# Patient Record
Sex: FEMALE | Race: WHITE | NOT HISPANIC OR LATINO
[De-identification: names, ages, dates, MRNs, and addresses within clinical notes are randomized per-mention and may not be internally consistent; named-entity substitution may affect disease eponyms.]

---

## 2016-05-06 RX ORDER — ISOSORBIDE MONONITRATE 60 MG/1
0.5 TABLET, EXTENDED RELEASE ORAL
Qty: 30 | Refills: 0 | DISCHARGE
Start: 2016-05-06 | End: 2016-06-05

## 2017-01-20 ENCOUNTER — APPOINTMENT (OUTPATIENT)
Dept: DERMATOLOGY | Facility: CLINIC | Age: 82
End: 2017-01-20

## 2017-02-04 ENCOUNTER — APPOINTMENT (OUTPATIENT)
Dept: RADIOLOGY | Facility: CLINIC | Age: 82
End: 2017-02-04

## 2017-02-04 ENCOUNTER — OUTPATIENT (OUTPATIENT)
Dept: OUTPATIENT SERVICES | Facility: HOSPITAL | Age: 82
LOS: 1 days | End: 2017-02-04
Payer: MEDICARE

## 2017-02-04 DIAGNOSIS — Z98.42 CATARACT EXTRACTION STATUS, LEFT EYE: Chronic | ICD-10-CM

## 2017-02-04 DIAGNOSIS — Z98.41 CATARACT EXTRACTION STATUS, RIGHT EYE: Chronic | ICD-10-CM

## 2017-02-04 DIAGNOSIS — Z00.8 ENCOUNTER FOR OTHER GENERAL EXAMINATION: ICD-10-CM

## 2017-02-04 DIAGNOSIS — Z90.81 ACQUIRED ABSENCE OF SPLEEN: Chronic | ICD-10-CM

## 2017-02-04 PROCEDURE — 71046 X-RAY EXAM CHEST 2 VIEWS: CPT

## 2017-04-14 ENCOUNTER — APPOINTMENT (OUTPATIENT)
Dept: DERMATOLOGY | Facility: CLINIC | Age: 82
End: 2017-04-14

## 2017-05-05 ENCOUNTER — APPOINTMENT (OUTPATIENT)
Dept: DERMATOLOGY | Facility: CLINIC | Age: 82
End: 2017-05-05

## 2017-08-01 ENCOUNTER — APPOINTMENT (OUTPATIENT)
Dept: DERMATOLOGY | Facility: CLINIC | Age: 82
End: 2017-08-01

## 2017-08-09 ENCOUNTER — APPOINTMENT (OUTPATIENT)
Dept: DERMATOLOGY | Facility: CLINIC | Age: 82
End: 2017-08-09
Payer: MEDICARE

## 2017-08-09 PROCEDURE — 99212 OFFICE O/P EST SF 10 MIN: CPT

## 2017-09-08 ENCOUNTER — APPOINTMENT (OUTPATIENT)
Dept: DERMATOLOGY | Facility: CLINIC | Age: 82
End: 2017-09-08

## 2017-10-11 ENCOUNTER — APPOINTMENT (OUTPATIENT)
Dept: GASTROENTEROLOGY | Facility: CLINIC | Age: 82
End: 2017-10-11
Payer: MEDICARE

## 2017-10-11 VITALS
DIASTOLIC BLOOD PRESSURE: 88 MMHG | BODY MASS INDEX: 20.66 KG/M2 | HEART RATE: 78 BPM | SYSTOLIC BLOOD PRESSURE: 170 MMHG | HEIGHT: 64 IN | WEIGHT: 121 LBS

## 2017-10-11 DIAGNOSIS — K58.9 IRRITABLE BOWEL SYNDROME W/OUT DIARRHEA: ICD-10-CM

## 2017-10-11 DIAGNOSIS — K21.9 GASTRO-ESOPHAGEAL REFLUX DISEASE W/OUT ESOPHAGITIS: ICD-10-CM

## 2017-10-11 DIAGNOSIS — Z85.038 PERSONAL HISTORY OF OTHER MALIGNANT NEOPLASM OF LARGE INTESTINE: ICD-10-CM

## 2017-10-11 PROCEDURE — 99213 OFFICE O/P EST LOW 20 MIN: CPT

## 2017-10-11 RX ORDER — NITROGLYCERIN 0.3 MG/1
0.3 TABLET SUBLINGUAL
Refills: 0 | Status: ACTIVE | COMMUNITY

## 2017-10-11 RX ORDER — SACCHAROMYCES BOULARDII 50 MG
250 CAPSULE ORAL
Refills: 0 | Status: ACTIVE | COMMUNITY

## 2017-10-11 RX ORDER — UBIDECARENONE 200 MG
500 CAPSULE ORAL
Refills: 0 | Status: ACTIVE | COMMUNITY

## 2017-10-11 RX ORDER — NICOTINE 11MG/24HR
50 MCG PATCH, TRANSDERMAL 24 HOURS TRANSDERMAL
Refills: 0 | Status: ACTIVE | COMMUNITY

## 2017-11-06 ENCOUNTER — APPOINTMENT (OUTPATIENT)
Dept: DERMATOLOGY | Facility: CLINIC | Age: 82
End: 2017-11-06
Payer: MEDICARE

## 2017-11-06 PROCEDURE — 99213 OFFICE O/P EST LOW 20 MIN: CPT

## 2017-12-20 ENCOUNTER — APPOINTMENT (OUTPATIENT)
Dept: DERMATOLOGY | Facility: CLINIC | Age: 82
End: 2017-12-20

## 2018-04-02 ENCOUNTER — APPOINTMENT (OUTPATIENT)
Dept: DERMATOLOGY | Facility: CLINIC | Age: 83
End: 2018-04-02
Payer: MEDICARE

## 2018-04-02 PROCEDURE — 99213 OFFICE O/P EST LOW 20 MIN: CPT

## 2018-08-22 ENCOUNTER — APPOINTMENT (OUTPATIENT)
Dept: DERMATOLOGY | Facility: CLINIC | Age: 83
End: 2018-08-22
Payer: MEDICARE

## 2018-08-22 PROCEDURE — 99212 OFFICE O/P EST SF 10 MIN: CPT

## 2018-09-12 ENCOUNTER — APPOINTMENT (OUTPATIENT)
Dept: DERMATOLOGY | Facility: CLINIC | Age: 83
End: 2018-09-12

## 2018-11-14 ENCOUNTER — APPOINTMENT (OUTPATIENT)
Dept: DERMATOLOGY | Facility: CLINIC | Age: 83
End: 2018-11-14
Payer: MEDICARE

## 2018-11-14 PROCEDURE — 99213 OFFICE O/P EST LOW 20 MIN: CPT

## 2018-12-03 ENCOUNTER — APPOINTMENT (OUTPATIENT)
Dept: DERMATOLOGY | Facility: CLINIC | Age: 83
End: 2018-12-03

## 2019-04-15 ENCOUNTER — APPOINTMENT (OUTPATIENT)
Dept: DERMATOLOGY | Facility: CLINIC | Age: 84
End: 2019-04-15
Payer: MEDICARE

## 2019-04-15 PROCEDURE — 99213 OFFICE O/P EST LOW 20 MIN: CPT

## 2019-10-21 ENCOUNTER — APPOINTMENT (OUTPATIENT)
Dept: DERMATOLOGY | Facility: CLINIC | Age: 84
End: 2019-10-21
Payer: MEDICARE

## 2019-10-21 PROCEDURE — 99213 OFFICE O/P EST LOW 20 MIN: CPT

## 2020-07-24 ENCOUNTER — APPOINTMENT (OUTPATIENT)
Dept: DERMATOLOGY | Facility: CLINIC | Age: 85
End: 2020-07-24

## 2020-10-02 ENCOUNTER — APPOINTMENT (OUTPATIENT)
Dept: DERMATOLOGY | Facility: CLINIC | Age: 85
End: 2020-10-02

## 2021-11-30 ENCOUNTER — INPATIENT (INPATIENT)
Facility: HOSPITAL | Age: 86
LOS: 3 days | Discharge: HOME | DRG: 177 | End: 2021-12-04
Attending: STUDENT IN AN ORGANIZED HEALTH CARE EDUCATION/TRAINING PROGRAM | Admitting: STUDENT IN AN ORGANIZED HEALTH CARE EDUCATION/TRAINING PROGRAM
Payer: MEDICARE

## 2021-11-30 VITALS
OXYGEN SATURATION: 95 % | TEMPERATURE: 98 F | SYSTOLIC BLOOD PRESSURE: 101 MMHG | WEIGHT: 125 LBS | DIASTOLIC BLOOD PRESSURE: 63 MMHG | HEIGHT: 60.4 IN | RESPIRATION RATE: 18 BRPM | HEART RATE: 74 BPM

## 2021-11-30 DIAGNOSIS — Z90.81 ACQUIRED ABSENCE OF SPLEEN: Chronic | ICD-10-CM

## 2021-11-30 DIAGNOSIS — Z98.41 CATARACT EXTRACTION STATUS, RIGHT EYE: Chronic | ICD-10-CM

## 2021-11-30 DIAGNOSIS — Z98.42 CATARACT EXTRACTION STATUS, LEFT EYE: Chronic | ICD-10-CM

## 2021-11-30 DIAGNOSIS — U07.1 COVID-19: ICD-10-CM

## 2021-11-30 LAB
ALBUMIN SERPL ELPH-MCNC: 3.8 G/DL — SIGNIFICANT CHANGE UP (ref 3.3–5.2)
ALP SERPL-CCNC: 81 U/L — SIGNIFICANT CHANGE UP (ref 40–120)
ALT FLD-CCNC: 15 U/L — SIGNIFICANT CHANGE UP
ANION GAP SERPL CALC-SCNC: 17 MMOL/L — SIGNIFICANT CHANGE UP (ref 5–17)
AST SERPL-CCNC: 26 U/L — SIGNIFICANT CHANGE UP
BILIRUB SERPL-MCNC: 1 MG/DL — SIGNIFICANT CHANGE UP (ref 0.4–2)
BUN SERPL-MCNC: 28 MG/DL — HIGH (ref 8–20)
CALCIUM SERPL-MCNC: 9.9 MG/DL — SIGNIFICANT CHANGE UP (ref 8.6–10.2)
CHLORIDE SERPL-SCNC: 96 MMOL/L — LOW (ref 98–107)
CO2 SERPL-SCNC: 19 MMOL/L — LOW (ref 22–29)
CREAT SERPL-MCNC: 1.05 MG/DL — SIGNIFICANT CHANGE UP (ref 0.5–1.3)
GLUCOSE SERPL-MCNC: 123 MG/DL — HIGH (ref 70–99)
HCT VFR BLD CALC: 44.9 % — SIGNIFICANT CHANGE UP (ref 34.5–45)
HGB BLD-MCNC: 13.9 G/DL — SIGNIFICANT CHANGE UP (ref 11.5–15.5)
MCHC RBC-ENTMCNC: 29.1 PG — SIGNIFICANT CHANGE UP (ref 27–34)
MCHC RBC-ENTMCNC: 31 GM/DL — LOW (ref 32–36)
MCV RBC AUTO: 94.1 FL — SIGNIFICANT CHANGE UP (ref 80–100)
PLATELET # BLD AUTO: 282 K/UL — SIGNIFICANT CHANGE UP (ref 150–400)
POTASSIUM SERPL-MCNC: 5.2 MMOL/L — SIGNIFICANT CHANGE UP (ref 3.5–5.3)
POTASSIUM SERPL-SCNC: 5.2 MMOL/L — SIGNIFICANT CHANGE UP (ref 3.5–5.3)
PROT SERPL-MCNC: 6.9 G/DL — SIGNIFICANT CHANGE UP (ref 6.6–8.7)
RAPID RVP RESULT: DETECTED
RBC # BLD: 4.77 M/UL — SIGNIFICANT CHANGE UP (ref 3.8–5.2)
RBC # FLD: 13.7 % — SIGNIFICANT CHANGE UP (ref 10.3–14.5)
RV+EV RNA SPEC QL NAA+PROBE: DETECTED
SARS-COV-2 RNA SPEC QL NAA+PROBE: DETECTED
SODIUM SERPL-SCNC: 132 MMOL/L — LOW (ref 135–145)
TROPONIN T SERPL-MCNC: 0.04 NG/ML — SIGNIFICANT CHANGE UP (ref 0–0.06)
WBC # BLD: 24.34 K/UL — HIGH (ref 3.8–10.5)
WBC # FLD AUTO: 24.34 K/UL — HIGH (ref 3.8–10.5)

## 2021-11-30 PROCEDURE — 99223 1ST HOSP IP/OBS HIGH 75: CPT

## 2021-11-30 PROCEDURE — 93010 ELECTROCARDIOGRAM REPORT: CPT

## 2021-11-30 PROCEDURE — 70450 CT HEAD/BRAIN W/O DYE: CPT | Mod: 26,MA

## 2021-11-30 PROCEDURE — 71045 X-RAY EXAM CHEST 1 VIEW: CPT | Mod: 26

## 2021-11-30 PROCEDURE — 99285 EMERGENCY DEPT VISIT HI MDM: CPT | Mod: CS,GC

## 2021-11-30 RX ORDER — SODIUM CHLORIDE 9 MG/ML
500 INJECTION INTRAMUSCULAR; INTRAVENOUS; SUBCUTANEOUS ONCE
Refills: 0 | Status: COMPLETED | OUTPATIENT
Start: 2021-11-30 | End: 2021-11-30

## 2021-11-30 RX ORDER — DEXAMETHASONE 0.5 MG/5ML
6 ELIXIR ORAL ONCE
Refills: 0 | Status: COMPLETED | OUTPATIENT
Start: 2021-11-30 | End: 2021-11-30

## 2021-11-30 RX ORDER — SODIUM CHLORIDE 9 MG/ML
500 INJECTION INTRAMUSCULAR; INTRAVENOUS; SUBCUTANEOUS
Refills: 0 | Status: COMPLETED | OUTPATIENT
Start: 2021-11-30 | End: 2021-12-01

## 2021-11-30 RX ORDER — ACETAMINOPHEN 500 MG
650 TABLET ORAL ONCE
Refills: 0 | Status: COMPLETED | OUTPATIENT
Start: 2021-11-30 | End: 2021-11-30

## 2021-11-30 RX ORDER — CEFTRIAXONE 500 MG/1
1000 INJECTION, POWDER, FOR SOLUTION INTRAMUSCULAR; INTRAVENOUS ONCE
Refills: 0 | Status: COMPLETED | OUTPATIENT
Start: 2021-11-30 | End: 2021-11-30

## 2021-11-30 RX ORDER — AZITHROMYCIN 500 MG/1
500 TABLET, FILM COATED ORAL ONCE
Refills: 0 | Status: COMPLETED | OUTPATIENT
Start: 2021-11-30 | End: 2021-11-30

## 2021-11-30 RX ADMIN — SODIUM CHLORIDE 500 MILLILITER(S): 9 INJECTION INTRAMUSCULAR; INTRAVENOUS; SUBCUTANEOUS at 20:05

## 2021-11-30 RX ADMIN — Medication 650 MILLIGRAM(S): at 20:30

## 2021-11-30 RX ADMIN — SODIUM CHLORIDE 500 MILLILITER(S): 9 INJECTION INTRAMUSCULAR; INTRAVENOUS; SUBCUTANEOUS at 19:05

## 2021-11-30 RX ADMIN — Medication 650 MILLIGRAM(S): at 19:05

## 2021-11-30 RX ADMIN — CEFTRIAXONE 1000 MILLIGRAM(S): 500 INJECTION, POWDER, FOR SOLUTION INTRAMUSCULAR; INTRAVENOUS at 19:35

## 2021-11-30 RX ADMIN — AZITHROMYCIN 255 MILLIGRAM(S): 500 TABLET, FILM COATED ORAL at 21:20

## 2021-11-30 RX ADMIN — CEFTRIAXONE 100 MILLIGRAM(S): 500 INJECTION, POWDER, FOR SOLUTION INTRAMUSCULAR; INTRAVENOUS at 19:05

## 2021-11-30 RX ADMIN — Medication 6 MILLIGRAM(S): at 21:20

## 2021-11-30 NOTE — ED ADULT TRIAGE NOTE - CHIEF COMPLAINT QUOTE
as per ems son was putting her in the stair lift and son says she passed out briefly denies any fall or injury

## 2021-11-30 NOTE — ED ADULT NURSE REASSESSMENT NOTE - REASSESS COMMUNICATION
daughter arrived to bedside, pt agreeable to be medicated as ordered. daughter arrived to bedside, pt agreeable to be medicated as ordered. pt and daughter would prefer no straight cath, PO fluids offered, will try to await urination on own.

## 2021-11-30 NOTE — ED PROVIDER NOTE - ATTENDING CONTRIBUTION TO CARE
Pt. awake and alert. Pt. s/p syncopal episode. No chest pain. NO SOB. I, Dr. Roman, performed a face to face bedside interview with this patient regarding history of present illness, review of symptoms and relevant past medical, social and family history.  I completed an independent physical examination.  I have also reviewed the resident's note(s) and discussed the plan with the resident.

## 2021-11-30 NOTE — H&P ADULT - ASSESSMENT
89 y/o female who lives with her son was BIBA after pt's son noticed that pt. was unresponsive for few seconds and was not answering his questions. As per pt's son she walks a little bit with walker, thinks pt. does not have swallow difficulty. no fall, no trauma, no seizure . no cp, no abd. pain, no n/v/d. pt. saw her Pcp shirley cook yesterday for cough and she was given cough syrup and amoxicillin. pt's son stated that he had cough about 1 week ago and was tested for covid-19 and it was negative, pt. was tested recntly as well and was negative. pt. is covdi-19 positive today. As per ER resident pt. was 89 % on RA on arrival.    - pneumonia , small rt. base infiltrate, pt. also positive for entero/ rhino virus, covid-19,  bacterial ? viral pneumonia ? wbc elevated, will keep on rocephin and zithromax for now, follow all cultures.     - Covid -19 infection, will keep on dexamethasone, o2 support, albuterol mdi , ID consult.     - Syncope and collapse, tele monitoring, serial trop, echo, carotid doppler, cardio consult [facility].    - primary htn, continue her home dose of labetalol, amlodipine and imdur, adjust meds as per bp response.

## 2021-11-30 NOTE — ED PROVIDER NOTE - NSICDXPASTMEDICALHX_GEN_ALL_CORE_FT
PAST MEDICAL HISTORY:  Angina at rest     HTN (hypertension)     Malignant neoplasm of colon, unspecified part of colon     Stroke x2

## 2021-11-30 NOTE — ED PROVIDER NOTE - OBJECTIVE STATEMENT
89 yo female history of CAD, CVA, HTN presents s/p syncopal episode earlier today. Per EMS, patient was observed to have passed out by her son for a brief amount of time for a short amount of time. She states that she did not hit her head, nor feel dizzy before/during/after the event. Patient states that she was started on a new medication by her physician a few days ago and has felt "off" since starting her medication. Patient cannot recall the exact name of this medication at this time. She states that otherwise, she feels well. She denies fever/chills, cough, sore throat, abd pain, n/v/d, urinary complaints, focal weakness/numbness/tingling in extremities, dizziness, headache.

## 2021-11-30 NOTE — H&P ADULT - NSICDXPASTMEDICALHX_GEN_ALL_CORE_FT
PAST MEDICAL HISTORY:  Angina at rest     HTN (hypertension)     Malignant neoplasm of colon, unspecified part of colon     Stroke x2     PAST MEDICAL HISTORY:  Angina at rest     HTN (hypertension)     Malignant neoplasm of colon, unspecified part of colon     Stroke x2. as per pt's son mini stroke and did not have any residual deficits

## 2021-11-30 NOTE — ED ADULT NURSE NOTE - CAS DISCH BELONGINGS RETURNED
Not applicable Plastic Surgeon Procedure Text (A): After obtaining clear surgical margins the patient was sent to plastics for surgical repair.  The patient understands they will receive post-surgical care and follow-up from the referring physician's office.

## 2021-11-30 NOTE — ED PROVIDER NOTE - NSICDXPASTSURGICALHX_GEN_ALL_CORE_FT
PAST SURGICAL HISTORY:  S/P cataract surgery, right     S/P splenectomy     Status post cataract extraction and insertion of intraocular lens of left eye

## 2021-11-30 NOTE — ED ADULT NURSE REASSESSMENT NOTE - NS ED NURSE REASSESS COMMENT FT1
Patient received from Critical, alert and oriented, no distress noted. Placed on cardiac monitor, patient NSR

## 2021-11-30 NOTE — ED PROVIDER NOTE - PHYSICAL EXAMINATION
Const: Awake, alert and oriented. In no acute distress. Well appearing.  HEENT: NC/AT. Moist mucous membranes. No signs of head trauma on exam.   Eyes: No scleral icterus. EOMI.  Neck:. Soft and supple. Full ROM without pain.  Cardiac: Regular rate and regular rhythm. +S1/S2. Peripheral pulses 2+ and symmetric. No LE edema.  Resp: Speaking in full sentences. No evidence of respiratory distress. No wheezes, rales or rhonchi.  Abd: Soft, non-tender, non-distended. Normal bowel sounds in all 4 quadrants. No guarding or rebound.  Back: Spine midline and non-tender. No CVAT.  Skin: No rashes, abrasions or lacerations.  Lymph: No cervical lymphadenopathy.  Neuro: Awake, alert & oriented x 3. Moves all extremities symmetrically. No focal neurologic deficits on exam.

## 2021-11-30 NOTE — H&P ADULT - HISTORY OF PRESENT ILLNESS
89 y/o female who lives with her son was KARMA after pt's son noticed that pt. was unresponsive for few seconds and was not answering his questions. As per pt's son she walks a little bit with walker, thinks pt. does not have swallow difficulty. no fall, no trauma, no seizure . no cp, no abd. pain, no n/v/d. pt. saw her Pcp shirley cook yesterday for cough and she was given cough syrup and amoxicillin. pt's son stated that he had cough about 1 week ago and was tested for covid-19 and it was negative, pt. was tested recntly as well and was negative. pt. is covdi-19 positive today. As per ER resident pt. was 89 % on RA on arrival.

## 2021-11-30 NOTE — H&P ADULT - NSHPPHYSICALEXAM_GEN_ALL_CORE
Vital Signs Last 24 Hrs  T(C): 36.8 (30 Nov 2021 21:29), Max: 37.8 (30 Nov 2021 15:43)  T(F): 98.3 (30 Nov 2021 21:29), Max: 100 (30 Nov 2021 15:43)  HR: 80 (30 Nov 2021 21:29) (74 - 87)  BP: 159/69 (30 Nov 2021 21:29) (101/63 - 159/69)  BP(mean): --  RR: 20 (30 Nov 2021 21:29) (18 - 22)  SpO2: 95% (30 Nov 2021 21:29) (91% - 95%)    General: An elderly female in bed not in distress. [place].  HEENT: AT, NC. PERRL. intact EOM. no throat erythema or exudate.   Neck: supple. no JVD.   Chest: few scattered rhonchi bilaterally.  Heart: S1,S2. RRR. no heart murmur. no edema.   Abdomen: soft. non-tender. non-distended. + BS.   Ext: no calf tenderness. pt. noted to move all ext. independently, distal pulses 2 +.  Neuro: Alert, awake , has eye contact , not giving much history, stating " I am ok ". no focal weakness. [place], as per pt. that is her baseline.  Skin: warm and dry, no pallor. no cyanosis.  lymphatic system : no lymphadenopathy.  psych : no agitation at this point ( as per son she may have sundowning ) , resting comfortably in bed. no si/hi.

## 2021-11-30 NOTE — ED PROVIDER NOTE - CLINICAL SUMMARY MEDICAL DECISION MAKING FREE TEXT BOX
89 yo female presents s/p syncopal episode earlier today. Will obtain cardiac workup. Monitor and reassess.

## 2021-12-01 LAB
ANION GAP SERPL CALC-SCNC: 14 MMOL/L — SIGNIFICANT CHANGE UP (ref 5–17)
APPEARANCE UR: CLEAR — SIGNIFICANT CHANGE UP
BACTERIA # UR AUTO: ABNORMAL
BASOPHILS # BLD AUTO: 0.01 K/UL — SIGNIFICANT CHANGE UP (ref 0–0.2)
BASOPHILS NFR BLD AUTO: 0.1 % — SIGNIFICANT CHANGE UP (ref 0–2)
BILIRUB UR-MCNC: NEGATIVE — SIGNIFICANT CHANGE UP
BUN SERPL-MCNC: 27.1 MG/DL — HIGH (ref 8–20)
CALCIUM SERPL-MCNC: 9.7 MG/DL — SIGNIFICANT CHANGE UP (ref 8.6–10.2)
CHLORIDE SERPL-SCNC: 99 MMOL/L — SIGNIFICANT CHANGE UP (ref 98–107)
CO2 SERPL-SCNC: 21 MMOL/L — LOW (ref 22–29)
COLOR SPEC: YELLOW — SIGNIFICANT CHANGE UP
CREAT SERPL-MCNC: 1 MG/DL — SIGNIFICANT CHANGE UP (ref 0.5–1.3)
CRP SERPL-MCNC: 75 MG/L — HIGH
D DIMER BLD IA.RAPID-MCNC: 418 NG/ML DDU — HIGH
DIFF PNL FLD: NEGATIVE — SIGNIFICANT CHANGE UP
EOSINOPHIL # BLD AUTO: 0 K/UL — SIGNIFICANT CHANGE UP (ref 0–0.5)
EOSINOPHIL NFR BLD AUTO: 0 % — SIGNIFICANT CHANGE UP (ref 0–6)
EPI CELLS # UR: SIGNIFICANT CHANGE UP
FERRITIN SERPL-MCNC: 408 NG/ML — HIGH (ref 15–150)
GLUCOSE SERPL-MCNC: 154 MG/DL — HIGH (ref 70–99)
GLUCOSE UR QL: NEGATIVE MG/DL — SIGNIFICANT CHANGE UP
HCT VFR BLD CALC: 42 % — SIGNIFICANT CHANGE UP (ref 34.5–45)
HGB BLD-MCNC: 13.5 G/DL — SIGNIFICANT CHANGE UP (ref 11.5–15.5)
IMM GRANULOCYTES NFR BLD AUTO: 0.4 % — SIGNIFICANT CHANGE UP (ref 0–1.5)
KETONES UR-MCNC: NEGATIVE — SIGNIFICANT CHANGE UP
LEUKOCYTE ESTERASE UR-ACNC: ABNORMAL
LYMPHOCYTES # BLD AUTO: 1.76 K/UL — SIGNIFICANT CHANGE UP (ref 1–3.3)
LYMPHOCYTES # BLD AUTO: 10.9 % — LOW (ref 13–44)
MCHC RBC-ENTMCNC: 28.9 PG — SIGNIFICANT CHANGE UP (ref 27–34)
MCHC RBC-ENTMCNC: 32.1 GM/DL — SIGNIFICANT CHANGE UP (ref 32–36)
MCV RBC AUTO: 89.9 FL — SIGNIFICANT CHANGE UP (ref 80–100)
MONOCYTES # BLD AUTO: 0.41 K/UL — SIGNIFICANT CHANGE UP (ref 0–0.9)
MONOCYTES NFR BLD AUTO: 2.5 % — SIGNIFICANT CHANGE UP (ref 2–14)
NEUTROPHILS # BLD AUTO: 13.86 K/UL — HIGH (ref 1.8–7.4)
NEUTROPHILS NFR BLD AUTO: 86.1 % — HIGH (ref 43–77)
NITRITE UR-MCNC: NEGATIVE — SIGNIFICANT CHANGE UP
PH UR: 6 — SIGNIFICANT CHANGE UP (ref 5–8)
PLATELET # BLD AUTO: 291 K/UL — SIGNIFICANT CHANGE UP (ref 150–400)
POTASSIUM SERPL-MCNC: 5.6 MMOL/L — HIGH (ref 3.5–5.3)
POTASSIUM SERPL-SCNC: 5.6 MMOL/L — HIGH (ref 3.5–5.3)
PROCALCITONIN SERPL-MCNC: 0.61 NG/ML — HIGH (ref 0.02–0.1)
PROT UR-MCNC: 30 MG/DL
RBC # BLD: 4.67 M/UL — SIGNIFICANT CHANGE UP (ref 3.8–5.2)
RBC # FLD: 13.5 % — SIGNIFICANT CHANGE UP (ref 10.3–14.5)
RBC CASTS # UR COMP ASSIST: NEGATIVE /HPF — SIGNIFICANT CHANGE UP (ref 0–4)
SODIUM SERPL-SCNC: 134 MMOL/L — LOW (ref 135–145)
SP GR SPEC: 1.01 — SIGNIFICANT CHANGE UP (ref 1.01–1.02)
TROPONIN T SERPL-MCNC: 0.02 NG/ML — SIGNIFICANT CHANGE UP (ref 0–0.06)
TROPONIN T SERPL-MCNC: 0.02 NG/ML — SIGNIFICANT CHANGE UP (ref 0–0.06)
UROBILINOGEN FLD QL: NEGATIVE MG/DL — SIGNIFICANT CHANGE UP
WBC # BLD: 16.11 K/UL — HIGH (ref 3.8–10.5)
WBC # FLD AUTO: 16.11 K/UL — HIGH (ref 3.8–10.5)
WBC UR QL: SIGNIFICANT CHANGE UP

## 2021-12-01 PROCEDURE — 93880 EXTRACRANIAL BILAT STUDY: CPT | Mod: 26

## 2021-12-01 PROCEDURE — 99233 SBSQ HOSP IP/OBS HIGH 50: CPT

## 2021-12-01 PROCEDURE — 99223 1ST HOSP IP/OBS HIGH 75: CPT

## 2021-12-01 RX ORDER — REMDESIVIR 5 MG/ML
200 INJECTION INTRAVENOUS EVERY 24 HOURS
Refills: 0 | Status: COMPLETED | OUTPATIENT
Start: 2021-12-01 | End: 2021-12-02

## 2021-12-01 RX ORDER — AMLODIPINE BESYLATE 2.5 MG/1
5 TABLET ORAL DAILY
Refills: 0 | Status: DISCONTINUED | OUTPATIENT
Start: 2021-12-01 | End: 2021-12-04

## 2021-12-01 RX ORDER — SACCHAROMYCES BOULARDII 250 MG
250 POWDER IN PACKET (EA) ORAL
Refills: 0 | Status: DISCONTINUED | OUTPATIENT
Start: 2021-12-01 | End: 2021-12-04

## 2021-12-01 RX ORDER — CEFTRIAXONE 500 MG/1
1000 INJECTION, POWDER, FOR SOLUTION INTRAMUSCULAR; INTRAVENOUS EVERY 24 HOURS
Refills: 0 | Status: DISCONTINUED | OUTPATIENT
Start: 2021-12-01 | End: 2021-12-03

## 2021-12-01 RX ORDER — ACETAMINOPHEN 500 MG
650 TABLET ORAL EVERY 6 HOURS
Refills: 0 | Status: DISCONTINUED | OUTPATIENT
Start: 2021-12-01 | End: 2021-12-04

## 2021-12-01 RX ORDER — AZITHROMYCIN 500 MG/1
500 TABLET, FILM COATED ORAL EVERY 24 HOURS
Refills: 0 | Status: DISCONTINUED | OUTPATIENT
Start: 2021-12-01 | End: 2021-12-03

## 2021-12-01 RX ORDER — LABETALOL HCL 100 MG
150 TABLET ORAL THREE TIMES A DAY
Refills: 0 | Status: DISCONTINUED | OUTPATIENT
Start: 2021-12-01 | End: 2021-12-04

## 2021-12-01 RX ORDER — REMDESIVIR 5 MG/ML
INJECTION INTRAVENOUS
Refills: 0 | Status: DISCONTINUED | OUTPATIENT
Start: 2021-12-01 | End: 2021-12-03

## 2021-12-01 RX ORDER — SODIUM ZIRCONIUM CYCLOSILICATE 10 G/10G
5 POWDER, FOR SUSPENSION ORAL ONCE
Refills: 0 | Status: DISCONTINUED | OUTPATIENT
Start: 2021-12-01 | End: 2021-12-04

## 2021-12-01 RX ORDER — ASCORBIC ACID 60 MG
500 TABLET,CHEWABLE ORAL
Refills: 0 | Status: DISCONTINUED | OUTPATIENT
Start: 2021-12-01 | End: 2021-12-04

## 2021-12-01 RX ORDER — ENOXAPARIN SODIUM 100 MG/ML
40 INJECTION SUBCUTANEOUS DAILY
Refills: 0 | Status: DISCONTINUED | OUTPATIENT
Start: 2021-12-01 | End: 2021-12-04

## 2021-12-01 RX ORDER — ASPIRIN/CALCIUM CARB/MAGNESIUM 324 MG
81 TABLET ORAL DAILY
Refills: 0 | Status: DISCONTINUED | OUTPATIENT
Start: 2021-12-01 | End: 2021-12-04

## 2021-12-01 RX ORDER — ALBUTEROL 90 UG/1
2 AEROSOL, METERED ORAL EVERY 6 HOURS
Refills: 0 | Status: DISCONTINUED | OUTPATIENT
Start: 2021-12-01 | End: 2021-12-04

## 2021-12-01 RX ORDER — ISOSORBIDE MONONITRATE 60 MG/1
120 TABLET, EXTENDED RELEASE ORAL DAILY
Refills: 0 | Status: DISCONTINUED | OUTPATIENT
Start: 2021-12-01 | End: 2021-12-04

## 2021-12-01 RX ORDER — DEXAMETHASONE 0.5 MG/5ML
6 ELIXIR ORAL DAILY
Refills: 0 | Status: DISCONTINUED | OUTPATIENT
Start: 2021-12-01 | End: 2021-12-03

## 2021-12-01 RX ORDER — CHOLECALCIFEROL (VITAMIN D3) 125 MCG
2000 CAPSULE ORAL EVERY 12 HOURS
Refills: 0 | Status: DISCONTINUED | OUTPATIENT
Start: 2021-12-01 | End: 2021-12-04

## 2021-12-01 RX ADMIN — SODIUM CHLORIDE 70 MILLILITER(S): 9 INJECTION INTRAMUSCULAR; INTRAVENOUS; SUBCUTANEOUS at 03:21

## 2021-12-01 RX ADMIN — ALBUTEROL 2 PUFF(S): 90 AEROSOL, METERED ORAL at 08:48

## 2021-12-01 RX ADMIN — Medication 150 MILLIGRAM(S): at 06:11

## 2021-12-01 RX ADMIN — ISOSORBIDE MONONITRATE 120 MILLIGRAM(S): 60 TABLET, EXTENDED RELEASE ORAL at 19:15

## 2021-12-01 RX ADMIN — Medication 1 TABLET(S): at 13:26

## 2021-12-01 RX ADMIN — Medication 2000 UNIT(S): at 19:19

## 2021-12-01 RX ADMIN — Medication 150 MILLIGRAM(S): at 17:38

## 2021-12-01 RX ADMIN — Medication 81 MILLIGRAM(S): at 13:26

## 2021-12-01 RX ADMIN — AMLODIPINE BESYLATE 5 MILLIGRAM(S): 2.5 TABLET ORAL at 05:56

## 2021-12-01 RX ADMIN — Medication 150 MILLIGRAM(S): at 21:24

## 2021-12-01 RX ADMIN — CEFTRIAXONE 100 MILLIGRAM(S): 500 INJECTION, POWDER, FOR SOLUTION INTRAMUSCULAR; INTRAVENOUS at 19:16

## 2021-12-01 RX ADMIN — ALBUTEROL 2 PUFF(S): 90 AEROSOL, METERED ORAL at 21:28

## 2021-12-01 RX ADMIN — Medication 250 MILLIGRAM(S): at 19:19

## 2021-12-01 RX ADMIN — ALBUTEROL 2 PUFF(S): 90 AEROSOL, METERED ORAL at 15:05

## 2021-12-01 RX ADMIN — Medication 500 MILLIGRAM(S): at 05:56

## 2021-12-01 RX ADMIN — ENOXAPARIN SODIUM 40 MILLIGRAM(S): 100 INJECTION SUBCUTANEOUS at 13:27

## 2021-12-01 RX ADMIN — Medication 6 MILLIGRAM(S): at 05:52

## 2021-12-01 RX ADMIN — Medication 2000 UNIT(S): at 05:56

## 2021-12-01 RX ADMIN — Medication 250 MILLIGRAM(S): at 05:53

## 2021-12-01 RX ADMIN — Medication 500 MILLIGRAM(S): at 19:13

## 2021-12-01 NOTE — CONSULT NOTE ADULT - SUBJECTIVE AND OBJECTIVE BOX
[facility]  INFECTIOUS DISEASES AND INTERNAL MEDICINE at [place]  =======================================================  Danielito Ruggiero MD  Diplomates American Board of Internal Medicine and Infectious Diseases  Tel: [phone]      Fax: [phone]  =======================================================      [id number]  ROSEANN DHEERAJ    CC: Patient is a 89y old  Female who presents with a chief complaint of syncope/ covid-19 infection (01 Dec 2021 11:34)      89 y/o female who lives with her son was BIBA after pt's son noticed that pt. was unresponsive for few seconds and was not answering his questions. As per pt's son she walks a little bit with walker, thinks pt. does not have swallow difficulty. no fall, no trauma, no seizure . no cp, no abd. pain, no n/v/d. pt. saw her Pcp shirley cook yesterday for cough and she was given cough syrup and amoxicillin. pt's son stated that he had cough about 1 week ago and was tested for covid-19 and it was negative, pt. was tested recntly as well and was negative. pt. is covdi-19 positive today. As per ER resident pt. was 89 % on RA on arrival.       Past Medical & Surgical Hx:  PAST MEDICAL & SURGICAL HISTORY:  Angina at rest    Stroke  x2. as per pt&#x27;s son mini stroke and did not have any residual deficits    Malignant neoplasm of colon, unspecified part of colon    HTN (hypertension)    S/P splenectomy    Status post cataract extraction and insertion of intraocular lens of left eye    S/P cataract surgery, right            Social Hx: non smoker    FAMILY HISTORY:  No pertinent family history in first degree relatives        Allergies    No Known Allergies    Intolerances             REVIEW OF SYSTEMS:  CONSTITUTIONAL:  No Fever or chills  HEENT:  No diplopia or blurred vision.  No earache, sore throat or runny nose.  CARDIOVASCULAR:  No pressure, squeezing, strangling, tightness, heaviness or aching about the chest, neck, axilla or epigastrium.  RESPIRATORY:  + cough, shortness of breath  GASTROINTESTINAL:  No nausea, vomiting or diarrhea.  GENITOURINARY:  No dysuria, frequency or urgency. No Blood in urine  MUSCULOSKELETAL:  no joint aches, no muscle pain  SKIN:  No change in skin, hair or nails.  NEUROLOGIC:  No Headaches, seizures or weakness.  PSYCHIATRIC:  No disorder of thought or mood.  ENDOCRINE:  No heat or cold intolerance  HEMATOLOGICAL:  No easy bruising or bleeding.       Physical Exam:    GEN: NAD, pleasant on o2  HEENT: normocephalic and atraumatic. EOMI. PERRL.  Anicteric  NECK: Supple.   LUNGS: Clear to auscultation.  HEART: Regular rate and rhythm without murmur.  ABDOMEN: Soft, nontender, and nondistended.  Positive bowel sounds.    : No CVA tenderness  EXTREMITIES: Without any edema.  MSK: No joint swelling  NEUROLOGIC: Cranial nerves II through XII are grossly intact. No Focal Deficits  PSYCHIATRIC: Appropriate affect .  SKIN: No Rash        Vitals:    T(F): 97.7 (01 Dec 2021 17:20), Max: 98.7 (30 Nov 2021 22:30)  HR: 78 (01 Dec 2021 19:11)  BP: 149/62 (01 Dec 2021 19:11)  RR: 17 (01 Dec 2021 17:20)  SpO2: 95% (01 Dec 2021 17:20) (92% - 97%)  temp max in last 48H T(F): , Max: 100 (11-30-21 @ 15:43)    Current Antibiotics:  azithromycin  IVPB 500 milliGRAM(s) IV Intermittent every 24 hours  cefTRIAXone   IVPB 1000 milliGRAM(s) IV Intermittent every 24 hours  remdesivir  IVPB   IV Intermittent   remdesivir  IVPB 200 milliGRAM(s) IV Intermittent every 24 hours    Other medications:  ALBUTerol    90 MICROgram(s) HFA Inhaler 2 Puff(s) Inhalation every 6 hours  amLODIPine   Tablet 5 milliGRAM(s) Oral daily  ascorbic acid 500 milliGRAM(s) Oral two times a day  aspirin enteric coated 81 milliGRAM(s) Oral daily  cholecalciferol 2000 Unit(s) Oral every 12 hours  dexAMETHasone  Injectable 6 milliGRAM(s) IV Push daily  enoxaparin Injectable 40 milliGRAM(s) SubCutaneous daily  isosorbide   mononitrate ER Tablet (IMDUR) 120 milliGRAM(s) Oral daily  labetalol 150 milliGRAM(s) Oral three times a day  multivitamin 1 Tablet(s) Oral daily  saccharomyces boulardii 250 milliGRAM(s) Oral two times a day  sodium zirconium cyclosilicate 5 Gram(s) Oral once                            13.5   16.11 )-----------( 291      ( 01 Dec 2021 06:19 )             42.0     12-01    134<L>  |  99  |  27.1<H>  ----------------------------<  154<H>  5.6<H>   |  21.0<L>  |  1.00    Ca    9.7      01 Dec 2021 06:19    TPro  6.9  /  Alb  3.8  /  TBili  1.0  /  DBili  x   /  AST  26  /  ALT  15  /  AlkPhos  81  11-30    RECENT CULTURES:  11-30 @ 17:11          Detected      WBC Count: 16.11 K/uL (12-01-21 @ 06:19)  WBC Count: 24.34 K/uL (11-30-21 @ 17:11)    Creatinine, Serum: 1.00 mg/dL (12-01-21 @ 06:19)  Creatinine, Serum: 1.05 mg/dL (11-30-21 @ 17:11)    C-Reactive Protein, Serum: 75 mg/L (12-01-21 @ 11:28)    Ferritin, Serum: 408 ng/mL (12-01-21 @ 11:28)      Procalcitonin, Serum: 0.61 ng/mL (12-01-21 @ 11:28)     Rapid RVP Result: Detected (11-30-21 @ 17:11)  SARS-CoV-2: Detected (11-30-21 @ 17:11)

## 2021-12-01 NOTE — CONSULT NOTE ADULT - SUBJECTIVE AND OBJECTIVE BOX
[facility]                                                    [address], [address], [place] [address]                                                         PHONE: [phone]    FAX: [phone] [address], [address], [place] [address]                                                 PHONE: [phone]    FAX: [phone]  *******************************************************************************    Reason for Consult: Syncope    HPI:  ROSEANN ESQUEDA is a 89y Female with h/o LBBB, normal LV fxn, CVA, HTN, HL a/w syncope.  Patient saw her PMD yesterday for a cough and was treated with cough syrup & amoxicillin.  She has been tested for COVID-19 and was negative but then tested positive in the ER.  She reportedly had an episode yesterday per her son in which she was unresponsive for a few seconds and was not answering his questions.  She is a poor historian.  She denies active chest pain, SOB or palpitations.    PAST MEDICAL & SURGICAL HISTORY:  Angina at rest    Stroke  x2. as per pt&#x27;s son mini stroke and did not have any residual deficits    Malignant neoplasm of colon, unspecified part of colon    HTN (hypertension)    S/P splenectomy    Status post cataract extraction and insertion of intraocular lens of left eye    S/P cataract surgery, right        No Known Allergies      MEDICATIONS  (STANDING):  ALBUTerol    90 MICROgram(s) HFA Inhaler 2 Puff(s) Inhalation every 6 hours  amLODIPine   Tablet 5 milliGRAM(s) Oral daily  ascorbic acid 500 milliGRAM(s) Oral two times a day  aspirin enteric coated 81 milliGRAM(s) Oral daily  azithromycin  IVPB 500 milliGRAM(s) IV Intermittent every 24 hours  cefTRIAXone   IVPB 1000 milliGRAM(s) IV Intermittent every 24 hours  cholecalciferol 2000 Unit(s) Oral every 12 hours  dexAMETHasone  Injectable 6 milliGRAM(s) IV Push daily  enoxaparin Injectable 40 milliGRAM(s) SubCutaneous daily  isosorbide   mononitrate ER Tablet (IMDUR) 120 milliGRAM(s) Oral daily  labetalol 150 milliGRAM(s) Oral three times a day  multivitamin 1 Tablet(s) Oral daily  saccharomyces boulardii 250 milliGRAM(s) Oral two times a day    MEDICATIONS  (PRN):  acetaminophen     Tablet .. 650 milliGRAM(s) Oral every 6 hours PRN Temp greater or equal to 38C (100.4F), Mild Pain (1 - 3), Moderate Pain (4 - 6)      Social History: no active tobacco / EtOH / IVDA    Family History: No pertinent family history in first degree relatives        ROS: As noted above, otherwise unremarkable.    Vital Signs Last 24 Hrs  T(C): 37 (01 Dec 2021 04:41), Max: 37.8 (30 Nov 2021 15:43)  T(F): 98.6 (01 Dec 2021 04:41), Max: 100 (30 Nov 2021 15:43)  HR: 79 (01 Dec 2021 04:41) (74 - 87)  BP: 162/73 (01 Dec 2021 04:41) (101/63 - 162/73)  BP(mean): --  RR: 17 (01 Dec 2021 04:41) (16 - 22)  SpO2: 97% (01 Dec 2021 04:41) (91% - 97%)    I&O's Detail    I&O's Summary          PHYSICAL EXAM:  General: Appears well developed, well nourished, no acute distress  HEENT: Head: normocephalic, atraumatic  Eyes: Pupils equal and reactive  Neck: Supple, no carotid bruit, no JVD, no HJR  CARDIOVASCULAR: Normal S1 and S2, I/VI syst M > LLSB  LUNGS: Slight rhonci bilaterally  ABDOMEN: Soft, nontender, non-distended, positive bowel sounds, no mass or bruit  EXTREMITIES: No edema, distal pulses WNL  SKIN: Warm and dry with normal turgor  NEURO: Alert, grossly intact, confused  PSYCH: normal mood and affect    LABS:                        13.5   16.11 )-----------( 291      ( 01 Dec 2021 06:19 )             42.0     12-01    134<L>  |  99  |  27.1<H>  ----------------------------<  154<H>  5.6<H>   |  21.0<L>  |  1.00    Ca    9.7      01 Dec 2021 06:19    TPro  6.9  /  Alb  3.8  /  TBili  1.0  /  DBili  x   /  AST  26  /  ALT  15  /  AlkPhos  81  11-30    CARDIAC MARKERS ( 01 Dec 2021 06:19 )  x     / 0.02 ng/mL / x     / x     / x      CARDIAC MARKERS ( 30 Nov 2021 17:11 )  x     / 0.04 ng/mL / x     / x     / x              RADIOLOGY & ADDITIONAL STUDIES:    EKG: NSR @ 76 bpm, LBBB    CT:    30-Nov-2021 18:10, CT Head No Cont  CT Head No Cont:   	 EXAM:  CT BRAIN                        	  	PROCEDURE DATE:  11/30/2021    	  	  	  	INTERPRETATION:  HISTORY: Syncope.  	  	COMPARISON: CT head 4/14/2016  	  	TECHNIQUE: Axial noncontrast CT images from the skull base to the vertex were obtained and submitted for interpretation. Coronal and sagittal reformatted images were performed. Bone and soft tissue windows were evaluated.  	  	FINDINGS:  	  	There is no acute intracranial mass-effect, hemorrhage, midline shift, or abnormal extra-axial fluid collection. Gray-white differentiation is maintained.  	  	Mild to moderate chronic microangiopathic ischemic changes. Atheromatous calcifications along the carotid siphons are present.  	  	Moderate centrally predominant cerebral volume loss noted.  No evidence ofhydrocephalus. Basal cisterns are patent.  	  	Mild bilateral maxillary sinus mucosal thickening. paranasal sinuses  and mastoid air cells are clear. Calvarium is intact. Bilateral lens replacements.  	  	IMPRESSION:  	  	No acute intracranial bleeding.  	Central volume loss and chronic microvascular ischemic changes.  	  	--- End of Report ---  	  	  	  	  	  	ROSALINA TALAVERA MD; Attending Radiologist  	This document has been electronically signed. Nov 30 2021  6:48PM  X-Ray, Fluoroscopy:    30-Nov-2021 15:34, Xray Chest 1 View- PORTABLE-Urgent  PACS Image: Image(s) Available  Xray Chest 1 View- PORTABLE-Urgent:   	 EXAM:  XR CHEST PORTABLE URGENT 1V                        	  	PROCEDURE DATE:  11/30/2021    	  	  	  	INTERPRETATION:  AP chest on November 30, 2021 at 3:04 PM. Patient had a syncopal episode.  	  	COMPARISON: No films available at this time.  	  	Heart magnified by technique. COPD hyperexpansion of the lungs is noted.  	  	Chronic apical thickening is seen.  	  	Small right base infiltrate.  	  	IMPRESSION: As above.  	  	--- End of Report ---  	  	  	  	  	  	YOMAIRA FRANKLIN MD; Attending Radiologist  	This document has been electronically signed. Nov 30 2021  4:20PM      Assessment and Plan:  In summary, ROSEANN ESQUEDA is a 89F a/w episode of unresponsiveness, cough, COVID-19 positive, RLL PNA, h/o LBBB, normal LV fxn, CVA, HTN, HL    - Monitor on telemetry  - Orthostatics  - No evidence of ischemia or CHF clinically, troponins negative, no chest pain, EKG with chronic LBBB.  Continue conservative medical management.  - Echocardiogram 8/3/21 demonstrated normal LV fxn (EF 55-60%), mild AR, mild MR, trace NH, mild TR, small pericardial effusion.  Repeat Echo pending.  - Carotid Duplex Scan  - Further work-up & testing per medicine  - Rhythm stable NSR 70-80s with LBBB on telemetry, BP increased.  Resume chronic doses of Labetalol 150 bid, Amlodipine 5 daily & Imdur 120 daily for now and titrate PRN.  - ASA 81 daily in place  - Treatment of COVID-19 & PNA per medicine    We will follow with you.  Thank you for allowing me to participate in the care of your patient.      Sincerely,    Adolfo Martinez MD

## 2021-12-01 NOTE — SWALLOW BEDSIDE ASSESSMENT ADULT - SLP PERTINENT HISTORY OF CURRENT PROBLEM
As per charting, "89 year old female with PMH HTN, Dyslipidemia, CVA and CA Colon presented with chest pain, cough, weakness and reportedly passing out, Hypoxic in ER 88% requiring supplemental O2WBC 24, RVP (+) for ARIANNA-CoV2 and Enterovirus and Chest X-Ray with RLL infiltrate."

## 2021-12-01 NOTE — PROGRESS NOTE ADULT - ASSESSMENT
89 year old female with PMH HTN, Dyslipidemia, CVA and CA Colon presented with chest pain, cough, weakness and reportedly passing out.  Hypoxic in ER 88% requiring supplemental O2  WBC 24, RVP (+) for ARIANNA-CoV2 and Enterovirus and Chest X-Ray with RLL infiltrate.      Acute Hypoxic respiratory failure secondary to underlying  Pneumonia, RLL. Possibly Bacterial given response to IV antibiotics within 24 hours. Also with viral component given RVP(+). Tm 100. Interval improvement in leukocytosis though continues to have hypoxia requiring supplemental O2  - Supplemental O2, titrate to SpO2 88-94%, wean as tolerated  - Continue Ceftriaxone and Azithromycin for now  - Sputum and blood culture  - Dexamethasone, Add Remdesivir - monitor SCr, LFTs  - Prone, Incentive spirometry, Chest PT  - Obtain and trend inflammatory markers  - Chest CT  - SLP for swallow evaluation given age and location infiltrate    Syncope, likely secondary to above  - Orthostatic VS  - monitor for arrhythmia  - TTE, Carotid dopplers    HTN  - Amlodipine, Labetalol, Isosorbide    VTEp - LMWH 40mg daily ( DDimer<2xUL, BMI 24)  PT, mobilize aggressively    Palliative care consult for GOC, advance directives   Acutely ill    Discussed with patient, RUBIN Bowens as well as iglesia Delaney

## 2021-12-01 NOTE — SWALLOW BEDSIDE ASSESSMENT ADULT - SWALLOW EVAL: DIAGNOSIS
Oral stage & pharyngeal stage WFL for administered consistencies with no overt s/s aspiration observed

## 2021-12-02 LAB
ALBUMIN SERPL ELPH-MCNC: 3.7 G/DL — SIGNIFICANT CHANGE UP (ref 3.3–5.2)
ALP SERPL-CCNC: 73 U/L — SIGNIFICANT CHANGE UP (ref 40–120)
ALT FLD-CCNC: 17 U/L — SIGNIFICANT CHANGE UP
ANION GAP SERPL CALC-SCNC: 13 MMOL/L — SIGNIFICANT CHANGE UP (ref 5–17)
AST SERPL-CCNC: 29 U/L — SIGNIFICANT CHANGE UP
BASOPHILS # BLD AUTO: 0.02 K/UL — SIGNIFICANT CHANGE UP (ref 0–0.2)
BASOPHILS NFR BLD AUTO: 0.1 % — SIGNIFICANT CHANGE UP (ref 0–2)
BILIRUB SERPL-MCNC: 0.3 MG/DL — LOW (ref 0.4–2)
BUN SERPL-MCNC: 33 MG/DL — HIGH (ref 8–20)
CALCIUM SERPL-MCNC: 9.7 MG/DL — SIGNIFICANT CHANGE UP (ref 8.6–10.2)
CHLORIDE SERPL-SCNC: 98 MMOL/L — SIGNIFICANT CHANGE UP (ref 98–107)
CO2 SERPL-SCNC: 23 MMOL/L — SIGNIFICANT CHANGE UP (ref 22–29)
CREAT SERPL-MCNC: 1.01 MG/DL — SIGNIFICANT CHANGE UP (ref 0.5–1.3)
EOSINOPHIL # BLD AUTO: 0 K/UL — SIGNIFICANT CHANGE UP (ref 0–0.5)
EOSINOPHIL NFR BLD AUTO: 0 % — SIGNIFICANT CHANGE UP (ref 0–6)
GLUCOSE SERPL-MCNC: 107 MG/DL — HIGH (ref 70–99)
HCT VFR BLD CALC: 40.6 % — SIGNIFICANT CHANGE UP (ref 34.5–45)
HGB BLD-MCNC: 13.3 G/DL — SIGNIFICANT CHANGE UP (ref 11.5–15.5)
IMM GRANULOCYTES NFR BLD AUTO: 0.5 % — SIGNIFICANT CHANGE UP (ref 0–1.5)
INR BLD: 1.02 RATIO — SIGNIFICANT CHANGE UP (ref 0.88–1.16)
LYMPHOCYTES # BLD AUTO: 13.2 % — SIGNIFICANT CHANGE UP (ref 13–44)
LYMPHOCYTES # BLD AUTO: 3.2 K/UL — SIGNIFICANT CHANGE UP (ref 1–3.3)
MCHC RBC-ENTMCNC: 28.9 PG — SIGNIFICANT CHANGE UP (ref 27–34)
MCHC RBC-ENTMCNC: 32.8 GM/DL — SIGNIFICANT CHANGE UP (ref 32–36)
MCV RBC AUTO: 88.3 FL — SIGNIFICANT CHANGE UP (ref 80–100)
MONOCYTES # BLD AUTO: 2.25 K/UL — HIGH (ref 0–0.9)
MONOCYTES NFR BLD AUTO: 9.3 % — SIGNIFICANT CHANGE UP (ref 2–14)
NEUTROPHILS # BLD AUTO: 18.69 K/UL — HIGH (ref 1.8–7.4)
NEUTROPHILS NFR BLD AUTO: 76.9 % — SIGNIFICANT CHANGE UP (ref 43–77)
PLATELET # BLD AUTO: 312 K/UL — SIGNIFICANT CHANGE UP (ref 150–400)
POTASSIUM SERPL-MCNC: 4.6 MMOL/L — SIGNIFICANT CHANGE UP (ref 3.5–5.3)
POTASSIUM SERPL-SCNC: 4.6 MMOL/L — SIGNIFICANT CHANGE UP (ref 3.5–5.3)
PROT SERPL-MCNC: 6.3 G/DL — LOW (ref 6.6–8.7)
PROTHROM AB SERPL-ACNC: 11.8 SEC — SIGNIFICANT CHANGE UP (ref 10.6–13.6)
RBC # BLD: 4.6 M/UL — SIGNIFICANT CHANGE UP (ref 3.8–5.2)
RBC # FLD: 13.6 % — SIGNIFICANT CHANGE UP (ref 10.3–14.5)
SODIUM SERPL-SCNC: 134 MMOL/L — LOW (ref 135–145)
WBC # BLD: 24.28 K/UL — HIGH (ref 3.8–10.5)
WBC # FLD AUTO: 24.28 K/UL — HIGH (ref 3.8–10.5)

## 2021-12-02 PROCEDURE — 93306 TTE W/DOPPLER COMPLETE: CPT | Mod: 26

## 2021-12-02 PROCEDURE — 99232 SBSQ HOSP IP/OBS MODERATE 35: CPT

## 2021-12-02 PROCEDURE — 99222 1ST HOSP IP/OBS MODERATE 55: CPT

## 2021-12-02 PROCEDURE — 99233 SBSQ HOSP IP/OBS HIGH 50: CPT

## 2021-12-02 PROCEDURE — 99497 ADVNCD CARE PLAN 30 MIN: CPT | Mod: 25

## 2021-12-02 RX ORDER — REMDESIVIR 5 MG/ML
100 INJECTION INTRAVENOUS EVERY 24 HOURS
Refills: 0 | Status: DISCONTINUED | OUTPATIENT
Start: 2021-12-03 | End: 2021-12-03

## 2021-12-02 RX ADMIN — Medication 2000 UNIT(S): at 18:19

## 2021-12-02 RX ADMIN — Medication 250 MILLIGRAM(S): at 18:19

## 2021-12-02 RX ADMIN — ALBUTEROL 2 PUFF(S): 90 AEROSOL, METERED ORAL at 15:50

## 2021-12-02 RX ADMIN — Medication 650 MILLIGRAM(S): at 23:49

## 2021-12-02 RX ADMIN — Medication 1 TABLET(S): at 13:38

## 2021-12-02 RX ADMIN — Medication 81 MILLIGRAM(S): at 13:38

## 2021-12-02 RX ADMIN — AZITHROMYCIN 255 MILLIGRAM(S): 500 TABLET, FILM COATED ORAL at 23:51

## 2021-12-02 RX ADMIN — ISOSORBIDE MONONITRATE 120 MILLIGRAM(S): 60 TABLET, EXTENDED RELEASE ORAL at 13:38

## 2021-12-02 RX ADMIN — AMLODIPINE BESYLATE 5 MILLIGRAM(S): 2.5 TABLET ORAL at 06:09

## 2021-12-02 RX ADMIN — ALBUTEROL 2 PUFF(S): 90 AEROSOL, METERED ORAL at 09:45

## 2021-12-02 RX ADMIN — AZITHROMYCIN 255 MILLIGRAM(S): 500 TABLET, FILM COATED ORAL at 00:11

## 2021-12-02 RX ADMIN — Medication 500 MILLIGRAM(S): at 06:08

## 2021-12-02 RX ADMIN — REMDESIVIR 500 MILLIGRAM(S): 5 INJECTION INTRAVENOUS at 01:16

## 2021-12-02 RX ADMIN — CEFTRIAXONE 100 MILLIGRAM(S): 500 INJECTION, POWDER, FOR SOLUTION INTRAMUSCULAR; INTRAVENOUS at 20:55

## 2021-12-02 RX ADMIN — ALBUTEROL 2 PUFF(S): 90 AEROSOL, METERED ORAL at 20:47

## 2021-12-02 RX ADMIN — Medication 2000 UNIT(S): at 06:09

## 2021-12-02 RX ADMIN — Medication 150 MILLIGRAM(S): at 06:09

## 2021-12-02 RX ADMIN — Medication 650 MILLIGRAM(S): at 00:19

## 2021-12-02 RX ADMIN — ENOXAPARIN SODIUM 40 MILLIGRAM(S): 100 INJECTION SUBCUTANEOUS at 13:39

## 2021-12-02 RX ADMIN — Medication 250 MILLIGRAM(S): at 06:08

## 2021-12-02 RX ADMIN — Medication 150 MILLIGRAM(S): at 23:50

## 2021-12-02 RX ADMIN — Medication 6 MILLIGRAM(S): at 06:11

## 2021-12-02 RX ADMIN — Medication 150 MILLIGRAM(S): at 13:39

## 2021-12-02 RX ADMIN — Medication 500 MILLIGRAM(S): at 18:20

## 2021-12-02 NOTE — CONSULT NOTE ADULT - ASSESSMENT
89 y/o female who lives with her son was BIBA after pt's son noticed that pt. was unresponsive for few seconds and was not answering his questions. As per pt's son she walks a little bit with walker, thinks pt. does not have swallow difficulty. no fall, no trauma, no seizure . no cp, no abd. pain, no n/v/d. pt. saw her Pcp shirley cook yesterday for cough and she was given cough syrup and amoxicillin. pt's son stated that he had cough about 1 week ago and was tested for covid-19 and it was negative, pt. was tested recntly as well and was negative. pt. is covdi-19 positive today. As per ER resident pt. was 89 % on RA on arrival.     COVID 19 + infection  Viral pneumonia  Acute hypoxic respiratory failure  Entero/rhinovirus   Leukocytosis  CAP      - patient is requiring supplemental O2  - inflammatory markers are elevated ferritin 75, crp 0.61  - D dimer 408  - procalcitonin testing  - Remdesivir 200 mg x 1 and then 100 mg daily x 5 days  - Decadron 6mg IV/PO daily while on remdesivir  - c/w ceftriaxone x 5 days, c.w azithromycin 500 mg x 3 days  - f/u BCX, sputum cx legionella  - VTE prophylaxis per current [facility] COVID 19 protocol  - Check CBC with diff, CMP with LFT's daily, Inflammatory markers, D dimer, procalcitonin q48h  - Encourage self proning q2h, incentive spirometry and ambulation as tolerated  - Taper off O2 as tolerated- once tolerating room air would ideally monitor for 24h prior to discharge.  - Inpatient Contact/Airborne isolation         Discussed with team  Will follow 
89 yo female with a history of HTN, mini stroke, colon cancer, s/p splenectomy, s/p cataract surgery, presents to [facility], BIBA, after being being found unresponsive for a few seconds and was not answering questions. Patient was found hypoxemic in the ED, elevated WBC. Patient tested positive for COVID-19, +entero/rhonovirus. Leukocytosis, with RLL infilitrate.    Palliative care consulted for goals of care    -Acute hypoxemic respiratory failure  -Syncope  -delirium  -debility  -palliative care encounter    Acute hypoxemic respiratory failure  -now off supplemental oxygen  -+ COVID 19, + enterovirus.   -noted with RLL infilitrate  ? Bacterial component, given noted improvement with initiation of antibx. WBC now rising from initial decline (on steroids).  -ID following  -on azithromycin and ceftriaxone  -on decadron, remdesivir.    -trend inflammatory markers  -CT chest pending  -incentive spirometry, chest PT, proning    Syncope  -noted positive orthostatics  -seen by cardiology  -carotid duplex scan: No hemodynamically significant stenosis in the visualized internal carotid arteries.  -on telemetry  - TTE pending  -falls precautions    Delirium:  -Per daughter, patient can be confused at times at home.  -In the setting of intercurrent condition, patient is at high risk for delirium  -Would provide reassurance, re-orientation.  Compartmentalize care, promote day-night distinction, minimize interruption of sleep.  Be mindful of use of medications on Beers criteria.    Debility  -supportive care.  PT rec home with 24 hr assist vs ARIANNA    Palliative care encounter:  -HCP on file: Charla Stanley, primary; alt: Rhett Koenig  -Full code orders  -spoke to HCP. Charla Stanley. Introduced role of palliative care in symptom management, care planning, support, and transitions in care to those with serious illness.  Emotional support offered.  -Per daughter, no previously expressed care preferences b y the patient.  -HCP reports that what matters most to the patient is being at home, where she lives with her son, Rhett.  -Discussed that CPR and intubation would not be expected to return her to a life at home, given age  -Encouraged ongoing discussions with pt and HCP about what matters most, so that a care plan consistent with patient's own preferences is in place.  -Palliative care will follow course

## 2021-12-02 NOTE — PHYSICAL THERAPY INITIAL EVALUATION ADULT - PERTINENT HX OF CURRENT PROBLEM, REHAB EVAL
89 year old female with PMH HTN, Dyslipidemia, CVA and CA Colon presented with chest pain, cough, weakness and reportedly passing out.

## 2021-12-02 NOTE — CONSULT NOTE ADULT - SUBJECTIVE AND OBJECTIVE BOX
HPI:  89 y/o female who lives with her son was BIBA after pt's son noticed that pt. was unresponsive for few seconds and was not answering his questions. As per pt's son she walks a little bit with walker, thinks pt. does not have swallow difficulty. no fall, no trauma, no seizure . no cp, no abd. pain, no n/v/d. pt. saw her Pcp shirley cook yesterday for cough and she was given cough syrup and amoxicillin. pt's son stated that he had cough about 1 week ago and was tested for covid-19 and it was negative, pt. was tested recntly as well and was negative. pt. is covdi-19 positive today. As per ER resident pt. was 89 % on RA on arrival.  (2021 23:38)    89 yo female with a history of HTN, mini stroke, colon cancer, s/p splenectomy, s/p cataract surgery, presents to [facility], BIBA, after being being found unresponsive for a few seconds and was not answering questions.  Patient was noted to have a cough about 1 week ago, was tested for COVID-19 (tested negative), and on the day prior to presentation went to her PCP for a cough, and was given cough syrup and amoxicillin.     Patient was found hypoxemic in the ED, elevated WBC.  CXR showed: COPD hyperexpansion of the lungs is noted, chronic apical thickening is seen.  Small right base infiltrate.  CT of the brain revealed: No acute intracranial bleeding.  Central volume loss and chronic microvascular ischemic changes.  Patient tested positive for COVID-19, and was started on decadron, ceftriaxone, azithromycin.  Seen by ID, who is recommending remdesivir.  Cardiology is also following in the setting of syncope; carotid duplex scan: no hemodynamically significant stenosis in the visualized internal carotid arteries.    Palliative care consulted to address goals of care.        PAST MEDICAL & SURGICAL HISTORY:    Stroke  x2. as per pt&#x27;s son mini stroke and did not have any residual deficits    Malignant neoplasm of colon, unspecified part of colon    HTN (hypertension)    S/P splenectomy    Status post cataract extraction and insertion of intraocular lens of left eye    S/P cataract surgery, right        SOCIAL HISTORY:    Admitted from:  home assisted living [facility]   Substance abuse history:              Tobacco hx:                  Alcohol hx:              Home Opioid hx:  Church:                                    Preferred Language:    Surrogate/HCP/Guardian:            Phone#:    FAMILY HISTORY:  No pertinent family history in first degree relatives      Baseline ADLs (prior to admission):    Allergies    No Known Allergies    Intolerances      Present Symptoms:   Dyspnea:   Nausea/Vomiting:   Anxiety:  Depressed   Fatigue:  Loss of appetite:   Pain:                                location:          Review of Systems: [All others negative or Unable to obtain due to poor mentation]    MEDICATIONS  (STANDING):  ALBUTerol    90 MICROgram(s) HFA Inhaler 2 Puff(s) Inhalation every 6 hours  amLODIPine   Tablet 5 milliGRAM(s) Oral daily  ascorbic acid 500 milliGRAM(s) Oral two times a day  aspirin enteric coated 81 milliGRAM(s) Oral daily  azithromycin  IVPB 500 milliGRAM(s) IV Intermittent every 24 hours  cefTRIAXone   IVPB 1000 milliGRAM(s) IV Intermittent every 24 hours  cholecalciferol 2000 Unit(s) Oral every 12 hours  dexAMETHasone  Injectable 6 milliGRAM(s) IV Push daily  enoxaparin Injectable 40 milliGRAM(s) SubCutaneous daily  isosorbide   mononitrate ER Tablet (IMDUR) 120 milliGRAM(s) Oral daily  labetalol 150 milliGRAM(s) Oral three times a day  multivitamin 1 Tablet(s) Oral daily  remdesivir  IVPB   IV Intermittent   saccharomyces boulardii 250 milliGRAM(s) Oral two times a day  sodium zirconium cyclosilicate 5 Gram(s) Oral once    MEDICATIONS  (PRN):  acetaminophen     Tablet .. 650 milliGRAM(s) Oral every 6 hours PRN Temp greater or equal to 38C (100.4F), Mild Pain (1 - 3), Moderate Pain (4 - 6)      PHYSICAL EXAM:    Vital Signs Last 24 Hrs  T(C): 36.7 (02 Dec 2021 06:01), Max: 36.7 (02 Dec 2021 01:19)  T(F): 98 (02 Dec 2021 06:01), Max: 98.1 (02 Dec 2021 01:19)  HR: 72 (02 Dec 2021 06:01) (72 - 86)  BP: 162/69 (02 Dec 2021 06:01) (143/56 - 168/69)  BP(mean): --  RR: 20 (02 Dec 2021 06:01) (17 - 20)  SpO2: 92% (02 Dec 2021 06:01) (92% - 95%)    General: alert  oriented x ____    [lethargic distressed cachexia  nonverbal  unarousable verbal]  Karnofsky Performance Score/Palliative Performance Status Version2:     %    HEENT: normal  dry mouth  ET tube/trach oral lesions:  Lungs: comfortable tachypnea/labored breathing  excessive secretions  CV: normal  tachycardia  GI: normal  distended  tender  incontinent               PEG/NG/OG tube  constipation  last BM:   : normal  incontinent  oliguria/anuria  ashby  Musculoskeletal: normal  weakness  edema             ambulatory  bedbound/wheelchair bound  Skin: normal  pressure ulcers: stage: edema: other:  Neuro: no deficits cognitive impairment dsyphagia/dysarthria paresis: other:  Oral intake ability: unable/only mouth care [minimal moderate full capability]  Diet: [NPO]    LABS:                        13.3   24.28 )-----------( 312      ( 02 Dec 2021 07:31 )             40.6     12-    134<L>  |  98  |  33.0<H>  ----------------------------<  107<H>  4.6   |  23.0  |  1.01    Ca    9.7      02 Dec 2021 07:31    TPro  6.3<L>  /  Alb  3.7  /  TBili  0.3<L>  /  DBili  x   /  AST  29  /  ALT  17  /  AlkPhos  73  12-02    Urinalysis Basic - ( 01 Dec 2021 10:48 )    Color: Yellow / Appearance: Clear / S.015 / pH: x  Gluc: x / Ketone: Negative  / Bili: Negative / Urobili: Negative mg/dL   Blood: x / Protein: 30 mg/dL / Nitrite: Negative   Leuk Esterase: Small / RBC: Negative /HPF / WBC 0-2   Sq Epi: x / Non Sq Epi: Occasional / Bacteria: Occasional        RADIOLOGY & ADDITIONAL STUDIES:    ADVANCE DIRECTIVES:   Advanced Care Planning discussion total time spent:   HPI:  89 y/o female who lives with her son was BIBA after pt's son noticed that pt. was unresponsive for few seconds and was not answering his questions. As per pt's son she walks a little bit with walker, thinks pt. does not have swallow difficulty. no fall, no trauma, no seizure . no cp, no abd. pain, no n/v/d. pt. saw her Pcp shirley cook yesterday for cough and she was given cough syrup and amoxicillin. pt's son stated that he had cough about 1 week ago and was tested for covid-19 and it was negative, pt. was tested recntly as well and was negative. pt. is covdi-19 positive today. As per ER resident pt. was 89 % on RA on arrival.  (2021 23:38)    HPI:  89 yo female with a history of HTN, mini stroke (per son, no residual effect), colon cancer, s/p splenectomy, s/p cataract surgery, presents to [facility], BIBA, after being being found unresponsive for a few seconds and was not answering questions.  Patient was noted to have a cough about 1 week ago, was tested for COVID-19 (tested negative), and on the day prior to presentation went to her PCP for a cough, and was given cough syrup and amoxicillin.     Patient was found hypoxemic in the ED, elevated WBC.  CXR showed: COPD hyperexpansion of the lungs is noted, chronic apical thickening is seen.  Small right base infiltrate.    CT of the brain revealed: No acute intracranial bleeding.  Central volume loss and chronic microvascular ischemic changes.  Patient tested positive for COVID-19, and was started on decadron, ceftriaxone, azithromycin. Also positive entero/rhinovirus.  Seen by ID, who is recommending remdesivir.  Cardiology is also following in the setting of syncope; carotid duplex scan: no hemodynamically significant stenosis in the visualized internal carotid arteries.    Palliative care consulted to address goals of care.    Unable to obtain HPI from patient.  Limited ROS.  Admits to fatigue and a cough.  Otherwise states that she is ok.        PERTINENT PMH REVIEWED: Yes     PAST MEDICAL & SURGICAL HISTORY:  Angina at rest    Stroke  x2. as per pt&#x27;s son mini stroke and did not have any residual deficits    Malignant neoplasm of colon, unspecified part of colon    HTN (hypertension)    S/P splenectomy    Status post cataract extraction and insertion of intraocular lens of left eye    S/P cataract surgery, right        SOCIAL HISTORY:  from home with son.                                     Surrogate/HCP/Guardian: Charla Stanley Phone#: [phone]    FAMILY HISTORY:  No pertinent family history in first degree relatives      Baseline ADLs (prior to admission): Independent with ADLs, assist needed with IADLs.  Independent/ Dependent      Allergies    No Known Allergies    Intolerances    Present Symptoms: as above.  Tangential.  ROS limited to above    Dyspnea:  Tangential.  ROS limited to above  Nausea/Vomiting:  Tangential.  ROS limited to above  Anxiety:   Tangential.  ROS limited to above  Depression:  Tangential.  ROS limited to above  Fatigue:  Tangential.  ROS limited to above  Loss of appetite:  Tangential.  ROS limited to above    Pain:  Tangential.  ROS limited to above    Review of Systems: Reviewed, limited to above.  Patient tangential.      MEDICATIONS  (STANDING):  ALBUTerol    90 MICROgram(s) HFA Inhaler 2 Puff(s) Inhalation every 6 hours  amLODIPine   Tablet 5 milliGRAM(s) Oral daily  ascorbic acid 500 milliGRAM(s) Oral two times a day  aspirin enteric coated 81 milliGRAM(s) Oral daily  azithromycin  IVPB 500 milliGRAM(s) IV Intermittent every 24 hours  cefTRIAXone   IVPB 1000 milliGRAM(s) IV Intermittent every 24 hours  cholecalciferol 2000 Unit(s) Oral every 12 hours  dexAMETHasone  Injectable 6 milliGRAM(s) IV Push daily  enoxaparin Injectable 40 milliGRAM(s) SubCutaneous daily  isosorbide   mononitrate ER Tablet (IMDUR) 120 milliGRAM(s) Oral daily  labetalol 150 milliGRAM(s) Oral three times a day  multivitamin 1 Tablet(s) Oral daily  remdesivir  IVPB   IV Intermittent   saccharomyces boulardii 250 milliGRAM(s) Oral two times a day  sodium zirconium cyclosilicate 5 Gram(s) Oral once    MEDICATIONS  (PRN):  acetaminophen     Tablet .. 650 milliGRAM(s) Oral every 6 hours PRN Temp greater or equal to 38C (100.4F), Mild Pain (1 - 3), Moderate Pain (4 - 6)      PHYSICAL EXAM:    Vital Signs Last 24 Hrs  T(C): 36.6 (02 Dec 2021 09:08), Max: 36.7 (02 Dec 2021 01:19)  T(F): 97.9 (02 Dec 2021 09:08), Max: 98.1 (02 Dec 2021 01:19)  HR: 73 (02 Dec 2021 09:08) (72 - 86)  BP: 163/64 (02 Dec 2021 09:08) (143/56 - 168/69)  BP(mean): --  RR: 19 (02 Dec 2021 09:08) (17 - 20)  SpO2: 95% (02 Dec 2021 09:08) (92% - 95%)    Karnofsky: 30 %    Gen: In NAD.  No pain behaviors or work of breathing noted  Neuro: alert to self, tangential  Head: NC/AT  Eyes: sclerae non-icteric  ENT: moist oral mucosa  Resp:  unlabored  CV: S1, S2  Abd: soft, non-tender, + bowels sounds  Peripheral Vasc: no pedal edema  Int: warm and dry  Psych: no psychomotor agitation      LABS:                        13.3   24.28 )-----------( 312      ( 02 Dec 2021 07:31 )             40.6     12-02    134<L>  |  98  |  33.0<H>  ----------------------------<  107<H>  4.6   |  23.0  |  1.01    Ca    9.7      02 Dec 2021 07:31    TPro  6.3<L>  /  Alb  3.7  /  TBili  0.3<L>  /  DBili  x   /  AST  29  /  ALT  17  /  AlkPhos  73  12-02    PT/INR - ( 02 Dec 2021 07:31 )   PT: 11.8 sec;   INR: 1.02 ratio           Urinalysis Basic - ( 01 Dec 2021 10:48 )    Color: Yellow / Appearance: Clear / S.015 / pH: x  Gluc: x / Ketone: Negative  / Bili: Negative / Urobili: Negative mg/dL   Blood: x / Protein: 30 mg/dL / Nitrite: Negative   Leuk Esterase: Small / RBC: Negative /HPF / WBC 0-2   Sq Epi: x / Non Sq Epi: Occasional / Bacteria: Occasional      I&O's Summary    01 Dec 2021 07:01  -  02 Dec 2021 07:00  --------------------------------------------------------  IN: 0 mL / OUT: 1020 mL / NET: -1020 mL        RADIOLOGY & ADDITIONAL STUDIES:    ADVANCE DIRECTIVES: reviewed  Full code orders                 HCP on file: Charla Stanley, primary; alt: Rhett Koenig  20 min spent in advance care planning

## 2021-12-02 NOTE — PROGRESS NOTE ADULT - ASSESSMENT
89 y/o female who lives with her son was BIBA after pt's son noticed that pt. was unresponsive for few seconds and was not answering his questions. As per pt's son she walks a little bit with walker, thinks pt. does not have swallow difficulty. no fall, no trauma, no seizure . no cp, no abd. pain, no n/v/d. pt. saw her Pcp shirley cook yesterday for cough and she was given cough syrup and amoxicillin. pt's son stated that he had cough about 1 week ago and was tested for covid-19 and it was negative, pt. was tested recntly as well and was negative. pt. is covdi-19 positive today. As per ER resident pt. was 89 % on RA on arrival.     COVID 19 + infection  Viral pneumonia  Acute hypoxic respiratory failure  Entero/rhinovirus   Leukocytosis  CAP      - patient is on room air  - inflammatory markers are elevated ferritin 75, crp 0.61  - D dimer 408  - procalcitonin 0.61  - Remdesivir 200 mg x 1 and then 100 mg daily x 5 days-Dc if remains stable on room air  - Decadron 6mg IV/PO daily while on remdesivir  - c/w ceftriaxone x 5 days (can complete with ceftin if going home), c.w azithromycin 500 mg x 3 days  - f/u BCX ngtd   - sputum cx legionella not sent  - VTE prophylaxis per current [facility] COVID 19 protocol  - Check CBC with diff, CMP with LFT's daily, Inflammatory markers, D dimer, procalcitonin q48h  - Encourage self proning q2h, incentive spirometry and ambulation as tolerated  - Taper off O2 as tolerated- once tolerating room air would ideally monitor for 24h prior to discharge.  - Inpatient Contact/Airborne isolation         please call with questions  signing off

## 2021-12-02 NOTE — PATIENT PROFILE ADULT - FALL HARM RISK - HARM RISK INTERVENTIONS

## 2021-12-02 NOTE — PHYSICAL THERAPY INITIAL EVALUATION ADULT - ADDITIONAL COMMENTS
pt is a poor historian. per chart, pt lives with son in a house with ?steps to enter and stairglide. has RW, transport chair. son works and pt is alone at times. pt is minimally ambulatory.

## 2021-12-02 NOTE — PROGRESS NOTE ADULT - ASSESSMENT
89 year old female with PMH HTN, Dyslipidemia, CVA and CA Colon presented with chest pain, cough, weakness and reportedly passing out.  Hypoxic in ER 88% requiring supplemental O2  WBC 24, RVP (+) for ARIANNA-CoV2 and Enterovirus and Chest X-Ray with RLL infiltrate.      Acute Hypoxic respiratory failure secondary to underlying  Pneumonia, RLL. Possibly Bacterial given response to IV antibiotics within 24 hours. Also with viral component given RVP(+).    Afebrile, Off O2, though rising WBC (steroids)  - Supplemental O2, titrate to SpO2 88-94%, wean as tolerated  - Continue Ceftriaxone and Azithromycin for now  - Sputum and blood culture  - Dexamethasone, Remdesivir - monitor SCr, LFTs  - Prone, Incentive spirometry, Chest PT  - Obtain and trend inflammatory markers  - Chest CT awaited  SLP recommendations for soft with thins, MBS in am    Syncope, with positive Orthostatic VS  - monitor for arrhythmia. If negative may discontinues monitor in next 24 hours  - TTE awaited    HTN  - Amlodipine, Labetalol, Isosorbide    VTEp - LMWH 40mg daily ( DDimer<2xUL, BMI 24)  PT, mobilize aggressively    Palliative care consult for GOC, advance directives   Acutely ill    Discussed with patient, daughter RUBIN Castaneda and CCC Gris   89 year old female with PMH HTN, Dyslipidemia, CVA and CA Colon presented with chest pain, cough, weakness and reportedly passing out.  Hypoxic in ER 88% requiring supplemental O2  WBC 24, RVP (+) for ARIANNA-CoV2 and Enterovirus and Chest X-Ray with RLL infiltrate.      Acute Hypoxic respiratory failure secondary to underlying  Pneumonia, RLL. Possibly Bacterial given response to IV antibiotics within 24 hours. Also with viral component given RVP(+).    Afebrile, Off O2, though rising WBC (steroids)  - Supplemental O2, titrate to SpO2 88-94%, wean as tolerated  - Continue Ceftriaxone and Azithromycin for now  - Sputum and blood culture  - Dexamethasone, Remdesivir - monitor SCr, LFTs  - Prone, Incentive spirometry, Chest PT  - Obtain and trend inflammatory markers  - Chest CT awaited  SLP recommendations for soft with thins, MBS in am    Syncope, with positive Orthostatic VS  - monitor for arrhythmia. If negative may discontinues monitor in next 24 hours  - TTE awaited    HTN  - Amlodipine, Labetalol, Isosorbide    VTEp - LMWH 40mg daily ( DDimer<2xUL, BMI 24)  PT, mobilize aggressively    Palliative care consult for GOC, advance directives   Acutely ill    Discussed with patient, daughter RUBIN Castaneda and CCC Gris  Discussed advance directives with patient daughter - patient [place]. Daughter states no directives in place but both her and her brother know what to do. Full code for now. Palliative consult

## 2021-12-03 ENCOUNTER — TRANSCRIPTION ENCOUNTER (OUTPATIENT)
Age: 86
End: 2021-12-03

## 2021-12-03 LAB
ALBUMIN SERPL ELPH-MCNC: 3.3 G/DL — SIGNIFICANT CHANGE UP (ref 3.3–5.2)
ALP SERPL-CCNC: 77 U/L — SIGNIFICANT CHANGE UP (ref 40–120)
ALT FLD-CCNC: 21 U/L — SIGNIFICANT CHANGE UP
ANION GAP SERPL CALC-SCNC: 14 MMOL/L — SIGNIFICANT CHANGE UP (ref 5–17)
AST SERPL-CCNC: 40 U/L — HIGH
BASOPHILS # BLD AUTO: 0.03 K/UL — SIGNIFICANT CHANGE UP (ref 0–0.2)
BASOPHILS NFR BLD AUTO: 0.2 % — SIGNIFICANT CHANGE UP (ref 0–2)
BILIRUB SERPL-MCNC: 0.2 MG/DL — LOW (ref 0.4–2)
BUN SERPL-MCNC: 31.9 MG/DL — HIGH (ref 8–20)
CALCIUM SERPL-MCNC: 9.7 MG/DL — SIGNIFICANT CHANGE UP (ref 8.6–10.2)
CHLORIDE SERPL-SCNC: 98 MMOL/L — SIGNIFICANT CHANGE UP (ref 98–107)
CO2 SERPL-SCNC: 22 MMOL/L — SIGNIFICANT CHANGE UP (ref 22–29)
CREAT SERPL-MCNC: 0.78 MG/DL — SIGNIFICANT CHANGE UP (ref 0.5–1.3)
EOSINOPHIL # BLD AUTO: 0.01 K/UL — SIGNIFICANT CHANGE UP (ref 0–0.5)
EOSINOPHIL NFR BLD AUTO: 0.1 % — SIGNIFICANT CHANGE UP (ref 0–6)
GLUCOSE SERPL-MCNC: 106 MG/DL — HIGH (ref 70–99)
HCT VFR BLD CALC: 44.9 % — SIGNIFICANT CHANGE UP (ref 34.5–45)
HGB BLD-MCNC: 14.8 G/DL — SIGNIFICANT CHANGE UP (ref 11.5–15.5)
IMM GRANULOCYTES NFR BLD AUTO: 0.7 % — SIGNIFICANT CHANGE UP (ref 0–1.5)
INR BLD: 1.02 RATIO — SIGNIFICANT CHANGE UP (ref 0.88–1.16)
LYMPHOCYTES # BLD AUTO: 14.9 % — SIGNIFICANT CHANGE UP (ref 13–44)
LYMPHOCYTES # BLD AUTO: 2.97 K/UL — SIGNIFICANT CHANGE UP (ref 1–3.3)
MCHC RBC-ENTMCNC: 28.8 PG — SIGNIFICANT CHANGE UP (ref 27–34)
MCHC RBC-ENTMCNC: 33 GM/DL — SIGNIFICANT CHANGE UP (ref 32–36)
MCV RBC AUTO: 87.4 FL — SIGNIFICANT CHANGE UP (ref 80–100)
MONOCYTES # BLD AUTO: 2.24 K/UL — HIGH (ref 0–0.9)
MONOCYTES NFR BLD AUTO: 11.3 % — SIGNIFICANT CHANGE UP (ref 2–14)
NEUTROPHILS # BLD AUTO: 14.49 K/UL — HIGH (ref 1.8–7.4)
NEUTROPHILS NFR BLD AUTO: 72.8 % — SIGNIFICANT CHANGE UP (ref 43–77)
PLATELET # BLD AUTO: 304 K/UL — SIGNIFICANT CHANGE UP (ref 150–400)
POTASSIUM SERPL-MCNC: 4.5 MMOL/L — SIGNIFICANT CHANGE UP (ref 3.5–5.3)
POTASSIUM SERPL-SCNC: 4.5 MMOL/L — SIGNIFICANT CHANGE UP (ref 3.5–5.3)
PROT SERPL-MCNC: 6.3 G/DL — LOW (ref 6.6–8.7)
PROTHROM AB SERPL-ACNC: 11.8 SEC — SIGNIFICANT CHANGE UP (ref 10.6–13.6)
RBC # BLD: 5.14 M/UL — SIGNIFICANT CHANGE UP (ref 3.8–5.2)
RBC # FLD: 13.4 % — SIGNIFICANT CHANGE UP (ref 10.3–14.5)
SODIUM SERPL-SCNC: 134 MMOL/L — LOW (ref 135–145)
WBC # BLD: 19.87 K/UL — HIGH (ref 3.8–10.5)
WBC # FLD AUTO: 19.87 K/UL — HIGH (ref 3.8–10.5)

## 2021-12-03 PROCEDURE — 71250 CT THORAX DX C-: CPT | Mod: 26

## 2021-12-03 PROCEDURE — 74230 X-RAY XM SWLNG FUNCJ C+: CPT | Mod: 26

## 2021-12-03 PROCEDURE — 99232 SBSQ HOSP IP/OBS MODERATE 35: CPT

## 2021-12-03 RX ORDER — LEVOFLOXACIN 5 MG/ML
1 INJECTION, SOLUTION INTRAVENOUS
Qty: 5 | Refills: 0
Start: 2021-12-03 | End: 2021-12-07

## 2021-12-03 RX ADMIN — Medication 2000 UNIT(S): at 17:14

## 2021-12-03 RX ADMIN — ALBUTEROL 2 PUFF(S): 90 AEROSOL, METERED ORAL at 14:54

## 2021-12-03 RX ADMIN — ALBUTEROL 2 PUFF(S): 90 AEROSOL, METERED ORAL at 22:27

## 2021-12-03 RX ADMIN — REMDESIVIR 500 MILLIGRAM(S): 5 INJECTION INTRAVENOUS at 01:04

## 2021-12-03 RX ADMIN — Medication 650 MILLIGRAM(S): at 23:00

## 2021-12-03 RX ADMIN — ALBUTEROL 2 PUFF(S): 90 AEROSOL, METERED ORAL at 08:16

## 2021-12-03 RX ADMIN — ENOXAPARIN SODIUM 40 MILLIGRAM(S): 100 INJECTION SUBCUTANEOUS at 13:26

## 2021-12-03 RX ADMIN — Medication 250 MILLIGRAM(S): at 06:23

## 2021-12-03 RX ADMIN — Medication 150 MILLIGRAM(S): at 06:24

## 2021-12-03 RX ADMIN — Medication 250 MILLIGRAM(S): at 17:14

## 2021-12-03 RX ADMIN — ISOSORBIDE MONONITRATE 120 MILLIGRAM(S): 60 TABLET, EXTENDED RELEASE ORAL at 13:24

## 2021-12-03 RX ADMIN — Medication 150 MILLIGRAM(S): at 13:25

## 2021-12-03 RX ADMIN — Medication 500 MILLIGRAM(S): at 17:13

## 2021-12-03 RX ADMIN — Medication 6 MILLIGRAM(S): at 06:25

## 2021-12-03 RX ADMIN — AMLODIPINE BESYLATE 5 MILLIGRAM(S): 2.5 TABLET ORAL at 06:23

## 2021-12-03 RX ADMIN — Medication 81 MILLIGRAM(S): at 13:24

## 2021-12-03 RX ADMIN — Medication 150 MILLIGRAM(S): at 22:53

## 2021-12-03 RX ADMIN — Medication 2000 UNIT(S): at 06:23

## 2021-12-03 RX ADMIN — Medication 650 MILLIGRAM(S): at 22:54

## 2021-12-03 RX ADMIN — Medication 500 MILLIGRAM(S): at 06:23

## 2021-12-03 RX ADMIN — Medication 1 TABLET(S): at 13:24

## 2021-12-03 NOTE — DISCHARGE NOTE PROVIDER - CARE PROVIDER_API CALL
[place])  Family Medicine  [address] [place] [address]  Phone: [phone]  Fax: [phone]  Follow Up Time:

## 2021-12-03 NOTE — PROGRESS NOTE ADULT - ASSESSMENT
89 year old female with PMH HTN, Dyslipidemia, CVA and CA Colon presented with chest pain, cough, weakness and reportedly passing out.  Hypoxic in ER 88% requiring supplemental O2  WBC 24, RVP (+) for ARIANNA-CoV2 and Enterovirus and Chest X-Ray with RLL infiltrate.      Acute Hypoxic respiratory failure secondary to underlying  Pneumonia, RLL - multifactorial as aspiration on MBS. Blood culture (-)  Also with viral component given RVP(+).    Afebrile, Off O2, though elevated WBC (steroids)  - Supplemental O2, titrate to SpO2 88-94%, wean as tolerated  - Aspiration precautions  - Continue Ceftriaxone and Azithromycin for now - complete 5 days  - Dexamethasone, Remdesivir - monitor SCr, LFTs  - Prone, Incentive spirometry, Chest PT  - Obtain and trend inflammatory markers      Syncope, with positive Orthostatic VS. No arrhythmia. Likely secondary to underlying pneumonia     HTN  - Amlodipine, Labetalol, Isosorbide    VTEp - LMWH 40mg daily ( DDimer<2xUL, BMI 24)  PT, mobilize aggressively    Family doesn't want ARIANNA       Discussed with  RUBIN ROWE and CCC Gris  Discussed advance directives with patient daughter no 12/2 - patient [place]. Daughter states no directives in place but both her and her brother know what to do. Full code for now. Palliative consult consult for GOC, advance directives     89 year old female with PMH HTN, Dyslipidemia, CVA and CA Colon presented with chest pain, cough, weakness and reportedly passing out.  Hypoxic in ER 88% requiring supplemental O2  WBC 24, RVP (+) for ARIANNA-CoV2 and Enterovirus and Chest X-Ray with RLL infiltrate.      Acute Hypoxic respiratory failure secondary to underlying  Pneumonia, RLL - multifactorial as aspiration on MBS. Blood culture (-)  Also with viral component given RVP(+).    Afebrile, Off O2, though elevated WBC (steroids)  - Supplemental O2, titrate to SpO2 88-94%, wean as tolerated  - Aspiration precautions, diet oreder placed as per SLP recommendations  - Change Antibiotics to Levaquin to cover aspiration pneumonia x 5 days  - Discontinue dexamethasone, Remdesivir given aspiration likely cause for pneumonia and not COVID-19  - Incentive spirometry, Chest PT      Syncope, with positive Orthostatic VS. No arrhythmia. Likely secondary to underlying pneumonia. Does have slightly low EF, daughter advised to follow up with Cardiologist - do not recommend any further testing currently     HTN  - Amlodipine, Labetalol, Isosorbide    VTEp - LMWH 40mg daily ( DDimer<2xUL, BMI 24)  PT, mobilize aggressively    Family doesn't want ARIANNA. Discharge home in next 24 hours       Discussed with  RUBIN ROWE and CCC Gris  Discussed advance directives with patient daughter no 12/2 - patient [place]. Daughter states no directives in place but both her and her brother know what to do. Full code for now. Palliative consult consult for GOC, advance directives      Discussed with daughter Charla - updated on MBS, CT and TTE.   Aspiration likely cause pneumonia and about discontinuing treatment for COVID and tailoring to aspiration pneumonia. Diet change and follow up with SLP. Follow up with Cardiology as warranted. Advance directives to be addressed   89 year old female with PMH HTN, Dyslipidemia, CVA and CA Colon presented with chest pain, cough, weakness and reportedly passing out.  Hypoxic in ER 88% requiring supplemental O2  WBC 24, RVP (+) for ARIANNA-CoV2 and Enterovirus and Chest X-Ray with RLL infiltrate.      Acute Hypoxic respiratory failure secondary to underlying  Pneumonia, RLL - multifactorial as aspiration on MBS. Blood culture (-)  Also with viral component given RVP(+).    Afebrile, Off O2, though elevated WBC (steroids)  - Supplemental O2, titrate to SpO2 88-94%, wean as tolerated  - Aspiration precautions, diet oreder placed as per SLP recommendations  - Change Antibiotics to Levaquin to cover aspiration pneumonia x 5 days  - Discontinue dexamethasone, Remdesivir given aspiration likely cause for pneumonia and not COVID-19  - Incentive spirometry, Chest PT      Syncope, with positive Orthostatic VS. No arrhythmia. Likely secondary to underlying pneumonia. Does have slightly low EF, daughter advised to follow up with Cardiologist - do not recommend any further testing currently     HTN  - Amlodipine, Labetalol, Isosorbide    Somnolence, likely delirium  - frequent reorientation, supportive care    VTEp - LMWH 40mg daily ( DDimer<2xUL, BMI 24)  PT, mobilize aggressively    Family doesn't want ARIANNA. Discharge home in next 24 hours       Discussed with  RN Davey ROWE and CCC Gris  Discussed advance directives with patient daughter no 12/2 - patient [place]. Daughter states no directives in place but both her and her brother know what to do. Full code for now. Palliative consult  for GOC, advance directives      Discussed with daughter Charla - updated on MBS, CT and TTE.   Aspiration likely cause pneumonia and about discontinuing treatment for COVID and tailoring to aspiration pneumonia. Diet change and follow up with SLP. Follow up with Cardiology as warranted. Advance directives to be addressed

## 2021-12-03 NOTE — DISCHARGE NOTE PROVIDER - NSDCCPCAREPLAN_GEN_ALL_CORE_FT
PRINCIPAL DISCHARGE DIAGNOSIS  Diagnosis: Pneumonia, aspiration  Assessment and Plan of Treatment: Aspiration precuation, diet as prescribed  Complete antibiotic course      SECONDARY DISCHARGE DIAGNOSES  Diagnosis: COVID-19 virus infection  Assessment and Plan of Treatment:     Diagnosis: Infection, enterovirus  Assessment and Plan of Treatment:

## 2021-12-03 NOTE — SWALLOW VFSS/MBS ASSESSMENT ADULT - RECOMMENDED FEEDING/EATING TECHNIQUES
moderately thick fluids via tspn only/allow for swallow between intakes/maintain upright posture during/after eating for 30 mins/no straws/oral hygiene/position upright (90 degrees)/provide rest periods between swallows

## 2021-12-03 NOTE — DISCHARGE NOTE NURSING/CASE MANAGEMENT/SOCIAL WORK - NSDCPEFALRISK_GEN_ALL_CORE
For information on Fall & Injury Prevention, visit: [email or web address] OR  [email or web address] OR  https://www.cdc.gov/steadi/patient.html

## 2021-12-03 NOTE — DISCHARGE NOTE NURSING/CASE MANAGEMENT/SOCIAL WORK - PATIENT PORTAL LINK FT
You can access the FollowMyHealth Patient Portal offered by [facility] by registering at the following website: [email or web address]. By joining [facility]’s FollowMyHealth portal, you will also be able to view your health information using other applications (apps) compatible with our system.

## 2021-12-03 NOTE — SWALLOW VFSS/MBS ASSESSMENT ADULT - COMMENTS
89 year old female with PMH HTN, Dyslipidemia, CVA and CA Colon presented with chest pain, cough, weakness and reportedly passing out.  Hypoxic in ER 88% requiring supplemental O2  WBC 24, RVP (+) for ARIANNA-CoV2 and Enterovirus and Chest X-Ray with RLL infiltrate.

## 2021-12-03 NOTE — SWALLOW VFSS/MBS ASSESSMENT ADULT - ORAL PHASE
within functional limits Delayed oral transit time Within functional limits/Uncontrolled bolus / spillover in radha-pharynx

## 2021-12-03 NOTE — SWALLOW VFSS/MBS ASSESSMENT ADULT - DIAGNOSTIC IMPRESSIONS
+Delayed cough after study completed Functional oral stage of swallow for puree, moderately thick & thin fluids, mild oral dysphagia for solids & mildly thick fluids, impacted by cognition, with reduced lingual coordination, inconsistent mastication. Pharyngeal stage of swallow functional for puree, solids & moderately thick fluids, via TSPN only. Moderate pharyngeal dysphagia for all fluid densities, marked by reduced laryngeal elevation & airway closure, +silent penetration during the swallow observed (thin fluids via cup, mildly thick via cup & tspn, moderately thick via cup). Compensatory postures were not trialed, due to decreased cognition. Pt with delayed cough after study completed

## 2021-12-03 NOTE — SWALLOW VFSS/MBS ASSESSMENT ADULT - SLP PERTINENT HISTORY OF CURRENT PROBLEM
Pt seen bedside x2.Last seen 12/2 with RX for easy to chew, thin fluids & MBS, to objectively assess swallow Pt seen bedside x2. Last seen 12/2 with RX for easy to chew, thin fluids & MBS, to objectively assess swallow

## 2021-12-03 NOTE — SWALLOW VFSS/MBS ASSESSMENT ADULT - ROSENBEK'S PENETRATION ASPIRATION SCALE
via cup sip; 1 = no penetration/aspiration via teaspoon presentation/(3) contrast remains above the vocal cords, visible residue remains (penetration) in 1/3 trials; 1 = no penetration/aspiration in 2/3 trials/(4) contrast contacts vocal cords, no residue remains (penetration) (5) contrast contacts vocal cords, visible residue remains (penetration) (1) no aspiration, contrast does not enter airway via cup sip and teaspoon presentation/(5) contrast contacts vocal cords, visible residue remains (penetration)

## 2021-12-03 NOTE — SWALLOW VFSS/MBS ASSESSMENT ADULT - SLP GENERAL OBSERVATIONS
Pt received & seen seated upright in stretcher, awake, yet eyes closed, reduced cognition, 1:1 present, 0/10 pain

## 2021-12-03 NOTE — CHART NOTE - NSCHARTNOTEFT_GEN_A_CORE
Pt agitated, combative attempting to get OOB. Pulling at lines. Will place on 1:1 at this time for safety.
Patient needs hospital bed given age, poor mobility, special positioning for dysphagia to prevent aspiration

## 2021-12-03 NOTE — DISCHARGE NOTE PROVIDER - HOSPITAL COURSE
89 year old female with PMH HTN, Dyslipidemia, CVA and CA Colon presented with chest pain, cough, weakness and reportedly passing out.  Hypoxic in ER 88% requiring supplemental O2. Noted to have leukocytosis (WBC 24), RVP (+) for ARIANNA-CoV2 and Enterovirus and Chest X-Ray with RLL infiltrate.    She was admitted for Acute Hypoxic respiratory failure secondary to underlying RLL Pneumonia initially presumed to be secondary to COVID-19, however patient responded to IV antibiotics within 24 hours with resolution of hypoxia. CT chest with patchy dependent RLL opacity and MBS significant for aspiration to thin liquids. She was initially started on treatment for COVID-19 pneumonia which was later discontinued upon further information. Blood cultures negative.    Questionable Syncope at home, noted to have positive Orthostatic VS. No arrhythmia. Likely secondary to underlying pneumonia. Does have slightly low EF, daughter advised to follow up with Cardiologist - do not recommend any further testing currently    Medically stable for discharge home.

## 2021-12-03 NOTE — DISCHARGE NOTE PROVIDER - NSDCMRMEDTOKEN_GEN_ALL_CORE_FT
amLODIPine 5 mg oral tablet: 1 tab(s) orally once a day  aspirin 81 mg oral tablet: orally once a day  Florastor 250 mg oral capsule: 1 cap(s) orally 2 times a day - for anxiety  isosorbide mononitrate 120 mg oral tablet, extended release: 1 tab(s) orally once a day (in the morning)  labetalol 100 mg oral tablet: 1.5 tab(s) orally 3 times a day  levoFLOXacin 500 mg oral tablet: 1 tab(s) orally every 24 hours  multivitamin:   Vitamin C 500 mg oral capsule: 1 cap(s) orally 2 times a day  Vitamin D3 50 mcg (2000 intl units) oral tablet: 1 tab(s) orally 2 times a day

## 2021-12-04 VITALS
DIASTOLIC BLOOD PRESSURE: 57 MMHG | OXYGEN SATURATION: 93 % | HEART RATE: 77 BPM | RESPIRATION RATE: 18 BRPM | SYSTOLIC BLOOD PRESSURE: 129 MMHG | TEMPERATURE: 98 F

## 2021-12-04 LAB
ALBUMIN SERPL ELPH-MCNC: 3.4 G/DL — SIGNIFICANT CHANGE UP (ref 3.3–5.2)
ALP SERPL-CCNC: 73 U/L — SIGNIFICANT CHANGE UP (ref 40–120)
ALT FLD-CCNC: 22 U/L — SIGNIFICANT CHANGE UP
ANION GAP SERPL CALC-SCNC: 14 MMOL/L — SIGNIFICANT CHANGE UP (ref 5–17)
AST SERPL-CCNC: 30 U/L — SIGNIFICANT CHANGE UP
BASOPHILS # BLD AUTO: 0.04 K/UL — SIGNIFICANT CHANGE UP (ref 0–0.2)
BASOPHILS NFR BLD AUTO: 0.2 % — SIGNIFICANT CHANGE UP (ref 0–2)
BILIRUB SERPL-MCNC: 0.4 MG/DL — SIGNIFICANT CHANGE UP (ref 0.4–2)
BUN SERPL-MCNC: 38.4 MG/DL — HIGH (ref 8–20)
CALCIUM SERPL-MCNC: 9.6 MG/DL — SIGNIFICANT CHANGE UP (ref 8.6–10.2)
CHLORIDE SERPL-SCNC: 101 MMOL/L — SIGNIFICANT CHANGE UP (ref 98–107)
CO2 SERPL-SCNC: 20 MMOL/L — LOW (ref 22–29)
CREAT SERPL-MCNC: 1.2 MG/DL — SIGNIFICANT CHANGE UP (ref 0.5–1.3)
CRP SERPL-MCNC: 12 MG/L — HIGH
D DIMER BLD IA.RAPID-MCNC: 299 NG/ML DDU — HIGH
EOSINOPHIL # BLD AUTO: 0.01 K/UL — SIGNIFICANT CHANGE UP (ref 0–0.5)
EOSINOPHIL NFR BLD AUTO: 0 % — SIGNIFICANT CHANGE UP (ref 0–6)
FERRITIN SERPL-MCNC: 318 NG/ML — HIGH (ref 15–150)
GLUCOSE SERPL-MCNC: 104 MG/DL — HIGH (ref 70–99)
HCT VFR BLD CALC: 44.8 % — SIGNIFICANT CHANGE UP (ref 34.5–45)
HGB BLD-MCNC: 14.7 G/DL — SIGNIFICANT CHANGE UP (ref 11.5–15.5)
IMM GRANULOCYTES NFR BLD AUTO: 0.6 % — SIGNIFICANT CHANGE UP (ref 0–1.5)
INR BLD: 1.07 RATIO — SIGNIFICANT CHANGE UP (ref 0.88–1.16)
LYMPHOCYTES # BLD AUTO: 20.2 % — SIGNIFICANT CHANGE UP (ref 13–44)
LYMPHOCYTES # BLD AUTO: 4.43 K/UL — HIGH (ref 1–3.3)
MCHC RBC-ENTMCNC: 28.9 PG — SIGNIFICANT CHANGE UP (ref 27–34)
MCHC RBC-ENTMCNC: 32.8 GM/DL — SIGNIFICANT CHANGE UP (ref 32–36)
MCV RBC AUTO: 88.2 FL — SIGNIFICANT CHANGE UP (ref 80–100)
MONOCYTES # BLD AUTO: 2.42 K/UL — HIGH (ref 0–0.9)
MONOCYTES NFR BLD AUTO: 11 % — SIGNIFICANT CHANGE UP (ref 2–14)
NEUTROPHILS # BLD AUTO: 14.91 K/UL — HIGH (ref 1.8–7.4)
NEUTROPHILS NFR BLD AUTO: 68 % — SIGNIFICANT CHANGE UP (ref 43–77)
PLATELET # BLD AUTO: 325 K/UL — SIGNIFICANT CHANGE UP (ref 150–400)
POTASSIUM SERPL-MCNC: 4.6 MMOL/L — SIGNIFICANT CHANGE UP (ref 3.5–5.3)
POTASSIUM SERPL-SCNC: 4.6 MMOL/L — SIGNIFICANT CHANGE UP (ref 3.5–5.3)
PROT SERPL-MCNC: 6.1 G/DL — LOW (ref 6.6–8.7)
PROTHROM AB SERPL-ACNC: 12.4 SEC — SIGNIFICANT CHANGE UP (ref 10.6–13.6)
RBC # BLD: 5.08 M/UL — SIGNIFICANT CHANGE UP (ref 3.8–5.2)
RBC # FLD: 13.6 % — SIGNIFICANT CHANGE UP (ref 10.3–14.5)
SODIUM SERPL-SCNC: 135 MMOL/L — SIGNIFICANT CHANGE UP (ref 135–145)
WBC # BLD: 21.95 K/UL — HIGH (ref 3.8–10.5)
WBC # FLD AUTO: 21.95 K/UL — HIGH (ref 3.8–10.5)

## 2021-12-04 PROCEDURE — 99238 HOSP IP/OBS DSCHRG MGMT 30/<: CPT

## 2021-12-04 RX ADMIN — ALBUTEROL 2 PUFF(S): 90 AEROSOL, METERED ORAL at 09:34

## 2021-12-04 RX ADMIN — Medication 2000 UNIT(S): at 05:48

## 2021-12-04 RX ADMIN — Medication 1 TABLET(S): at 12:15

## 2021-12-04 RX ADMIN — Medication 250 MILLIGRAM(S): at 05:47

## 2021-12-04 RX ADMIN — ALBUTEROL 2 PUFF(S): 90 AEROSOL, METERED ORAL at 05:07

## 2021-12-04 RX ADMIN — Medication 650 MILLIGRAM(S): at 12:15

## 2021-12-04 RX ADMIN — ALBUTEROL 2 PUFF(S): 90 AEROSOL, METERED ORAL at 16:54

## 2021-12-04 RX ADMIN — Medication 500 MILLIGRAM(S): at 05:47

## 2021-12-04 RX ADMIN — Medication 81 MILLIGRAM(S): at 12:15

## 2021-12-04 RX ADMIN — Medication 150 MILLIGRAM(S): at 05:47

## 2021-12-04 RX ADMIN — ISOSORBIDE MONONITRATE 120 MILLIGRAM(S): 60 TABLET, EXTENDED RELEASE ORAL at 12:15

## 2021-12-04 RX ADMIN — AMLODIPINE BESYLATE 5 MILLIGRAM(S): 2.5 TABLET ORAL at 05:47

## 2021-12-04 RX ADMIN — Medication 150 MILLIGRAM(S): at 14:51

## 2021-12-04 RX ADMIN — Medication 650 MILLIGRAM(S): at 13:15

## 2021-12-04 RX ADMIN — ENOXAPARIN SODIUM 40 MILLIGRAM(S): 100 INJECTION SUBCUTANEOUS at 12:16

## 2021-12-04 NOTE — PROGRESS NOTE ADULT - SUBJECTIVE AND OBJECTIVE BOX
HOSPITALIST PROGRESS NOTE    ROSEANN ESQUEDA  [id number]  89yFemale    Patient is a 89y old  Female who presents with a chief complaint of syncope/ covid-19 infection (02 Dec 2021 19:03)      SUBJECTIVE:   Chart reviewed since last visit.    Agitated overnight - 1:1 at bedside  SLP performed MBS - aspiration with fluids.  Recommend moderately thickened liquids    Patient seen and examined at bedside earlier today  for pneumonia, COVID-19.  Somnolent today - doesn't respond to questions consistently.  Denies any dyspnea, chest pain, fever or chills        OBJECTIVE:  Vital Signs Last 24 Hrs  T(C): 36.4 (03 Dec 2021 12:54), Max: 36.6 (02 Dec 2021 23:47)  T(F): 97.5 (03 Dec 2021 12:54), Max: 97.8 (02 Dec 2021 23:47)  HR: 78 (03 Dec 2021 12:54) (68 - 78)  BP: 171/70 (03 Dec 2021 12:54) (135/80 - 177/73)   RR: 19 (03 Dec 2021 12:54) (19 - 20)  SpO2: 95% (03 Dec 2021 12:54) (92% - 95%)    PHYSICAL EXAMINATION  General: Elderly female lying in bed, sleeping, pushing away examiner when tried to wake up.  HEENT:  No supplemental O2  NECK:  Supple  CVS: regular rate and rhythm S1 S2  RESP:  Poor effort  GI:  Soft nondistended nontender BS+  : No suprapubic tenderness  MSK:  FROM, No edema  CNS:  Hearing loss. Somnolent  INTEG:  warm dry skin  PSYCH:  Somnolent      MONITOR:  CAPILLARY BLOOD GLUCOSE            I&O's Summary    02 Dec 2021 07:01  -  03 Dec 2021 07:00  --------------------------------------------------------  IN: 0 mL / OUT: 600 mL / NET: -600 mL                            14.8   19.87 )-----------( 304      ( 03 Dec 2021 08:40 )             44.9     PT/INR - ( 03 Dec 2021 08:41 )   PT: 11.8 sec;   INR: 1.02 ratio           12-03    134<L>  |  98  |  31.9<H>  ----------------------------<  106<H>  4.5   |  22.0  |  0.78    Ca    9.7      03 Dec 2021 08:40    TPro  6.3<L>  /  Alb  3.3  /  TBili  0.2<L>  /  DBili  x   /  AST  40<H>  /  ALT  21  /  AlkPhos  77  12-03            Culture:    TTE:  < from: TTE Echo Complete w/o Contrast w/ Doppler (12.02.21 @ 21:57) >  Summary:   1. Technically difficult study.   2. There is mild concentric left ventricular hypertrophy.   3. Mildly decreased global left ventricular systolic function.   4. Left ventricular ejection fraction, by visual estimation, is 45 to 50%.   5. Spectral Doppler shows impaired relaxation pattern of left ventricular myocardial filling (Grade I diastolic dysfunction).   6. Normal right ventricular size and function.   7. Normal left atrial size.   8. Normal right atrial size.   9. Sclerotic aortic valve with normalopening.  10. Mild to moderate aortic regurgitation.  11. Mild thickening of the anterior and posterior mitral valve leaflets.  12. Trace mitral valve regurgitation.  13. Mild-moderate tricuspid regurgitation.    MD Reginaldo Electronically signed on 12/3/2021 at 9:40:08 AM    < end of copied text >    RADIOLOGY  < from: CT Chest No Cont (12.03.21 @ 11:06) >    IMPRESSION:  Scattered areas of bronchiectasis, most severe in the right middle lobe and lingula. Partial atelectasis right middle lobe and lingula.    Patchy dependent right lower lobe opacity. A few other well-defined groundglass opacities are present bilaterally. These findings are indeterminate and may be infectious or inflammatory. Recommend follow-up CT recommended.    --- End of Report ---            RADHA CAMPBELL MD; Attending Radiologist  This document has been electronically signed. Dec  3 2021 11:36AM    < end of copied text >        MEDICATIONS  (STANDING):  ALBUTerol    90 MICROgram(s) HFA Inhaler 2 Puff(s) Inhalation every 6 hours  amLODIPine   Tablet 5 milliGRAM(s) Oral daily  ascorbic acid 500 milliGRAM(s) Oral two times a day  aspirin enteric coated 81 milliGRAM(s) Oral daily  azithromycin  IVPB 500 milliGRAM(s) IV Intermittent every 24 hours  cefTRIAXone   IVPB 1000 milliGRAM(s) IV Intermittent every 24 hours  cholecalciferol 2000 Unit(s) Oral every 12 hours  dexAMETHasone  Injectable 6 milliGRAM(s) IV Push daily  enoxaparin Injectable 40 milliGRAM(s) SubCutaneous daily  isosorbide   mononitrate ER Tablet (IMDUR) 120 milliGRAM(s) Oral daily  labetalol 150 milliGRAM(s) Oral three times a day  multivitamin 1 Tablet(s) Oral daily  remdesivir  IVPB   IV Intermittent   remdesivir  IVPB 100 milliGRAM(s) IV Intermittent every 24 hours  saccharomyces boulardii 250 milliGRAM(s) Oral two times a day  sodium zirconium cyclosilicate 5 Gram(s) Oral once      MEDICATIONS  (PRN):  acetaminophen     Tablet .. 650 milliGRAM(s) Oral every 6 hours PRN Temp greater or equal to 38C (100.4F), Mild Pain (1 - 3), Moderate Pain (4 - 6)  
  HOSPITALIST PROGRESS NOTE    ROSEANN ESQUEDA  [id number]  89yFemale    Patient is a 89y old  Female who presents with a chief complaint of syncope/ covid-19 infection (01 Dec 2021 06:53)      SUBJECTIVE:   Chart reviewed since admission.    Relates not feeling well, having central sharp chest pain, cough and weakness    Patient seen and examined at bedside for pneumonia, syncope, COVID-19  Denies any chest pain or dyspnea currently. Cough productive, unable to expectorate. Denies any fever or chills. Denies any dizziness, nausea, vomiting, dysuria or diarrhea.    OBJECTIVE:  Vital Signs Last 24 Hrs  T(C): 36.3 (01 Dec 2021 08:36), Max: 37.8 (2021 15:43)  T(F): 97.4 (01 Dec 2021 08:36), Max: 100 (2021 15:43)  HR: 69 (01 Dec 2021 08:36) (69 - 87)  BP: 153/61 (01 Dec 2021 08:36) (101/63 - 162/73)   RR: 16 (01 Dec 2021 08:36) (16 - 22)  SpO2: 95% (01 Dec 2021 08:36) (91% - 97%)    PHYSICAL EXAMINATION  General: Elderly female sitting up in bed, no acute distress. occasional productive cough  HEENT:  3LNC  NECK:  Supple  CVS: regular rate and rhythm S1 S2  RESP:  Fair entry bilaterally though has rhonchi throughout lung fields with coarse crackles RLL  GI:  Soft nondistended nontender BS+  : No suprapubic tenderness  MSK:  FROM, No edema  CNS:  Hearing loss. Follows commands  INTEG:  warm dry skin  PSYCH:  Fair mood    MONITOR:  CAPILLARY BLOOD GLUCOSE      Respiratory Viral Panel with COVID-19 by DES (21 @ 17:11)   Rapid RVP Result: Detected   SARS-CoV-2: Detected:   Entero/Rhinovirus (RapRVP): Detected                         13.5   16.11 )-----------( 291      ( 01 Dec 2021 06:19 )             42.0       12    134<L>  |  99  |  27.1<H>  ----------------------------<  154<H>  5.6<H>   |  21.0<L>  |  1.00    Ca    9.7      01 Dec 2021 06:19    TPro  6.9  /  Alb  3.8  /  TBili  1.0  /  DBili  x   /  AST  26  /  ALT  15  /  AlkPhos  81  11-30    CARDIAC MARKERS ( 01 Dec 2021 06:19 )  x     / 0.02 ng/mL / x     / x     / x      CARDIAC MARKERS ( 2021 17:11 )  x     / 0.04 ng/mL / x     / x     / x          Urinalysis Basic - ( 01 Dec 2021 10:48 )    Color: Yellow / Appearance: Clear / S.015 / pH: x  Gluc: x / Ketone: Negative  / Bili: Negative / Urobili: Negative mg/dL   Blood: x / Protein: 30 mg/dL / Nitrite: Negative   Leuk Esterase: Small / RBC: Negative /HPF / WBC 0-2   Sq Epi: x / Non Sq Epi: Occasional / Bacteria: Occasional        Culture:    TTE:    RADIOLOGY       < from: Xray Chest 1 View- PORTABLE-Urgent (Xray Chest 1 View- PORTABLE-Urgent .) (21 @ 15:34) >  PROCEDURE DATE:  2021          INTERPRETATION:  AP chest on 2021 at 3:04 PM. Patient had a syncopal episode.    COMPARISON: No films available at this time.    Heart magnified by technique. COPD hyperexpansion of the lungs is noted.    Chronic apical thickening is seen.    Small right base infiltrate.    IMPRESSION: As above.    --- End of Report ---            YOMAIRA FRANKLIN MD; Attending Radiologist  This document has been electronically signed. 2021  4:20PM    < end of copied text >    < from: CT Head No Cont (21 @ 18:10) >  IMPRESSION:    No acute intracranial bleeding.  Central volume loss and chronic microvascular ischemic changes.    --- End of Report ---            ROSALINA TALAVERA MD; Attending Radiologist  This document has been electronically signed. 2021  6:48PM    < end of copied text >    MEDICATIONS  (STANDING):  ALBUTerol    90 MICROgram(s) HFA Inhaler 2 Puff(s) Inhalation every 6 hours  amLODIPine   Tablet 5 milliGRAM(s) Oral daily  ascorbic acid 500 milliGRAM(s) Oral two times a day  aspirin enteric coated 81 milliGRAM(s) Oral daily  azithromycin  IVPB 500 milliGRAM(s) IV Intermittent every 24 hours  cefTRIAXone   IVPB 1000 milliGRAM(s) IV Intermittent every 24 hours  cholecalciferol 2000 Unit(s) Oral every 12 hours  dexAMETHasone  Injectable 6 milliGRAM(s) IV Push daily  enoxaparin Injectable 40 milliGRAM(s) SubCutaneous daily  isosorbide   mononitrate ER Tablet (IMDUR) 120 milliGRAM(s) Oral daily  labetalol 150 milliGRAM(s) Oral three times a day  multivitamin 1 Tablet(s) Oral daily  saccharomyces boulardii 250 milliGRAM(s) Oral two times a day      MEDICATIONS  (PRN):  acetaminophen     Tablet .. 650 milliGRAM(s) Oral every 6 hours PRN Temp greater or equal to 38C (100.4F), Mild Pain (1 - 3), Moderate Pain (4 - 6)  
  HOSPITALIST PROGRESS NOTE    ROSEANN ESQUEDA  [id number]  89yFemale    Patient is a 89y old  Female who presents with a chief complaint of syncope/ covid-19 infection (02 Dec 2021 08:56)      SUBJECTIVE:   Chart reviewed since last visit.  Patient seen and examined at bedside for hypoxia, pneumonia, COVID-19, syncope.  Feels better - occasional dyspnea. Feels weak  Denies any dizziness, chest pain, palpitations or fever or chills      OBJECTIVE:  Vital Signs Last 24 Hrs  T(C): 36.6 (02 Dec 2021 09:08), Max: 36.7 (02 Dec 2021 01:19)  T(F): 97.9 (02 Dec 2021 09:08), Max: 98.1 (02 Dec 2021 01:19)  HR: 73 (02 Dec 2021 09:08) (72 - 86)  BP: 163/64 (02 Dec 2021 09:08) (143/56 - 168/69)   RR: 19 (02 Dec 2021 09:08) (17 - 20)  SpO2: 95% (02 Dec 2021 09:08) (92% - 95%)    PHYSICAL EXAMINATION  General: Elderly female sitting up in bed, no acute distress. Occasional productive cough  HEENT:  No supplemental O2  NECK:  Supple  CVS: regular rate and rhythm S1 S2  RESP:  Fair entry bilaterally clear with with coarse crackles RLL - improved  GI:  Soft nondistended nontender BS+  : No suprapubic tenderness  MSK:  FROM, No edema  CNS:  Hearing loss. Follows commands  INTEG:  warm dry skin  PSYCH:  Fair mood    MONITOR:  CAPILLARY BLOOD GLUCOSE            I&O's Summary    01 Dec 2021 07:01  -  02 Dec 2021 07:00  --------------------------------------------------------  IN: 0 mL / OUT: 1020 mL / NET: -1020 mL                            13.3   24.28 )-----------( 312      ( 02 Dec 2021 07:31 )             40.6     PT/INR - ( 02 Dec 2021 07:31 )   PT: 11.8 sec;   INR: 1.02 ratio           12-02    134<L>  |  98  |  33.0<H>  ----------------------------<  107<H>  4.6   |  23.0  |  1.01    Ca    9.7      02 Dec 2021 07:31    TPro  6.3<L>  /  Alb  3.7  /  TBili  0.3<L>  /  DBili  x   /  AST  29  /  ALT  17  /  AlkPhos  73  12-02    CARDIAC MARKERS ( 01 Dec 2021 11:28 )  x     / 0.02 ng/mL / x     / x     / x      CARDIAC MARKERS ( 01 Dec 2021 06:19 )  x     / 0.02 ng/mL / x     / x     / x      CARDIAC MARKERS ( 2021 17:11 )  x     / 0.04 ng/mL / x     / x     / x          Urinalysis Basic - ( 01 Dec 2021 10:48 )    Color: Yellow / Appearance: Clear / S.015 / pH: x  Gluc: x / Ketone: Negative  / Bili: Negative / Urobili: Negative mg/dL   Blood: x / Protein: 30 mg/dL / Nitrite: Negative   Leuk Esterase: Small / RBC: Negative /HPF / WBC 0-2   Sq Epi: x / Non Sq Epi: Occasional / Bacteria: Occasional    Ferritin, Serum: 408 ng/mL (21 @ 11:28)   C-Reactive Protein, Serum (21 @ 11:28)   C-Reactive Protein, Serum: 75 mg/L   D-Dimer Assay, Quantitative: 418 ng/mL DDU (21 @ 09:09)     Culture:    TTE:    RADIOLOGY  < from: US Duplex Carotid Arteries Complete, Bilateral (21 @ 18:43) >  IMPRESSION:    No hemodynamically significant stenosis in the visualized internal carotid arteries.    Measurement of carotid stenosis is based on velocity parameters that correlate the residual internal carotid diameter with that of the more distal vessel in accordance with a method such as the [place] [employer] Symptomatic Carotid Endarterectomy Trial (NASCET).    --- End of Report ---            ANTON CURRY MD; Attending Radiologist  This document has been electronically signed. Dec  2 2021  6:58AM    < end of copied text >        MEDICATIONS  (STANDING):  ALBUTerol    90 MICROgram(s) HFA Inhaler 2 Puff(s) Inhalation every 6 hours  amLODIPine   Tablet 5 milliGRAM(s) Oral daily  ascorbic acid 500 milliGRAM(s) Oral two times a day  aspirin enteric coated 81 milliGRAM(s) Oral daily  azithromycin  IVPB 500 milliGRAM(s) IV Intermittent every 24 hours  cefTRIAXone   IVPB 1000 milliGRAM(s) IV Intermittent every 24 hours  cholecalciferol 2000 Unit(s) Oral every 12 hours  dexAMETHasone  Injectable 6 milliGRAM(s) IV Push daily  enoxaparin Injectable 40 milliGRAM(s) SubCutaneous daily  isosorbide   mononitrate ER Tablet (IMDUR) 120 milliGRAM(s) Oral daily  labetalol 150 milliGRAM(s) Oral three times a day  multivitamin 1 Tablet(s) Oral daily  remdesivir  IVPB   IV Intermittent   saccharomyces boulardii 250 milliGRAM(s) Oral two times a day  sodium zirconium cyclosilicate 5 Gram(s) Oral once      MEDICATIONS  (PRN):  acetaminophen     Tablet .. 650 milliGRAM(s) Oral every 6 hours PRN Temp greater or equal to 38C (100.4F), Mild Pain (1 - 3), Moderate Pain (4 - 6)  
Patient is a 89y old  Female who presents with a chief complaint of syncope/ covid-19 infection (03 Dec 2021 17:23)      INTERVAL HPI/OVERNIGHT EVENTS: seen and examined. NO complains, Does not know why she is in the hospital, but anxious to go home.      MEDICATIONS  (STANDING):  ALBUTerol    90 MICROgram(s) HFA Inhaler 2 Puff(s) Inhalation every 6 hours  amLODIPine   Tablet 5 milliGRAM(s) Oral daily  ascorbic acid 500 milliGRAM(s) Oral two times a day  aspirin enteric coated 81 milliGRAM(s) Oral daily  cholecalciferol 2000 Unit(s) Oral every 12 hours  enoxaparin Injectable 40 milliGRAM(s) SubCutaneous daily  isosorbide   mononitrate ER Tablet (IMDUR) 120 milliGRAM(s) Oral daily  labetalol 150 milliGRAM(s) Oral three times a day  levoFLOXacin  Tablet 500 milliGRAM(s) Oral every 24 hours  multivitamin 1 Tablet(s) Oral daily  saccharomyces boulardii 250 milliGRAM(s) Oral two times a day  sodium zirconium cyclosilicate 5 Gram(s) Oral once    MEDICATIONS  (PRN):  acetaminophen     Tablet .. 650 milliGRAM(s) Oral every 6 hours PRN Temp greater or equal to 38C (100.4F), Mild Pain (1 - 3), Moderate Pain (4 - 6)      Allergies    No Known Allergies    Intolerances        REVIEW OF SYSTEMS:  CONSTITUTIONAL: No fever, weight loss, or fatigue  RESPIRATORY: No cough, wheezing, chills or hemoptysis; No shortness of breath  CARDIOVASCULAR: No chest pain, palpitations, dizziness, or leg swelling  GASTROINTESTINAL: No abdominal or epigastric pain. No nausea, vomiting, or hematemesis; No diarrhea or constipation. No melena or hematochezia.  NEUROLOGICAL: No headaches, memory loss, loss of strength, numbness, or tremors  MUSCULOSKELETAL: No joint pain or swelling; No muscle, back, or extremity pain      Vital Signs Last 24 Hrs  T(C): 36.9 (04 Dec 2021 09:33), Max: 36.9 (04 Dec 2021 09:33)  T(F): 98.5 (04 Dec 2021 09:33), Max: 98.5 (04 Dec 2021 09:33)  HR: 72 (04 Dec 2021 12:10) (52 - 78)  BP: 128/52 (04 Dec 2021 12:10) (128/52 - 171/70)  BP(mean): --  RR: 18 (04 Dec 2021 09:33) (18 - 20)  SpO2: 93% (04 Dec 2021 09:33) (93% - 95%)    PHYSICAL EXAM:  GENERAL: NAD, sitting on the bed   HEAD:  Atraumatic, Normocephalic  EYES: EOMI, PERRLA, conjunctiva and sclera clear  NECK: Supple, No JVD, Normal thyroid  NERVOUS SYSTEM:  Alert & Oriented X2, No gross focal deficits  CHEST/LUNG: Clear to percussion bilaterally; No rales, rhonchi, wheezing, or rubs  HEART: Regular rate and rhythm; No murmurs, rubs, or gallops  ABDOMEN: Soft, Nontender, Nondistended; Bowel sounds present  EXTREMITIES:  No clubbing, cyanosis, or edema  SKIN: No rashes or lesions    LABS:                        14.7   21.95 )-----------( 325      ( 04 Dec 2021 10:54 )             44.8     12-04    135  |  101  |  38.4<H>  ----------------------------<  104<H>  4.6   |  20.0<L>  |  1.20    Ca    9.6      04 Dec 2021 10:54    TPro  6.1<L>  /  Alb  3.4  /  TBili  0.4  /  DBili  x   /  AST  30  /  ALT  22  /  AlkPhos  73  12-04    PT/INR - ( 04 Dec 2021 10:54 )   PT: 12.4 sec;   INR: 1.07 ratio             CAPILLARY BLOOD GLUCOSE          RADIOLOGY & ADDITIONAL TESTS:    Imaging Personally Reviewed:  [ ] YES  [ ] NO    Consultant(s) Notes Reviewed:  [ ] YES  [ ] NO    Care Discussed with Consultants/Other Providers [ ] YES  [ ] NO    Plan of Care discussed with Housestaff [ ]YES [ ] NO
[facility]  INFECTIOUS DISEASES AND INTERNAL MEDICINE at [place]  =======================================================  Danielito Ruggiero MD  Diplomates American Board of Internal Medicine and Infectious Diseases  Tel: [phone]      Fax: [phone]  =======================================================    ROSEANN ESQUEDA [id number]    Follow up:      Allergies:  No Known Allergies           REVIEW OF SYSTEMS:  CONSTITUTIONAL:  No Fever or chills  HEENT:   No diplopia or blurred vision.  No earache, sore throat or runny nose.  CARDIOVASCULAR:  No pressure, squeezing, strangling, tightness, heaviness or aching about the chest, neck, axilla or epigastrium.  RESPIRATORY:  No cough, shortness of breath  GASTROINTESTINAL:  No nausea, vomiting or diarrhea.  GENITOURINARY:  No dysuria, frequency or urgency. No Blood in urine  MUSCULOSKELETAL:  no joint aches, no muscle pain  SKIN:  No change in skin, hair or nails.  NEUROLOGIC:  No Headaches, seizures or weakness.  PSYCHIATRIC:  No disorder of thought or mood.  ENDOCRINE:  No heat or cold intolerance  HEMATOLOGICAL:  No easy bruising or bleeding.       Physical Exam:  GEN: NAD, pleasant  HEENT: normocephalic and atraumatic. EOMI. PERRL.  Anicteric   NECK: Supple.   LUNGS: Clear to auscultation.  HEART: Regular rate and rhythm without murmur.  ABDOMEN: Soft, nontender, and nondistended.  Positive bowel sounds.    : No CVA tenderness  EXTREMITIES: Without any edema.  MSK: no joint swelling  NEUROLOGIC: Cranial nerves II through XII are grossly intact. No focal deficits  PSYCHIATRIC: Appropriate affect .  SKIN: No Rash      Vitals:    T(F): 97.6 (02 Dec 2021 16:45), Max: 98.1 (02 Dec 2021 01:19)  HR: 68 (02 Dec 2021 16:45)  BP: 135/80 (02 Dec 2021 16:45)  RR: 19 (02 Dec 2021 16:45)  SpO2: 93% (02 Dec 2021 16:45) (92% - 95%)  temp max in last 48H T(F): , Max: 98.7 (11-30-21 @ 22:30)    Current Antibiotics:  azithromycin  IVPB 500 milliGRAM(s) IV Intermittent every 24 hours  cefTRIAXone   IVPB 1000 milliGRAM(s) IV Intermittent every 24 hours  remdesivir  IVPB   IV Intermittent     Other medications:  ALBUTerol    90 MICROgram(s) HFA Inhaler 2 Puff(s) Inhalation every 6 hours  amLODIPine   Tablet 5 milliGRAM(s) Oral daily  ascorbic acid 500 milliGRAM(s) Oral two times a day  aspirin enteric coated 81 milliGRAM(s) Oral daily  cholecalciferol 2000 Unit(s) Oral every 12 hours  dexAMETHasone  Injectable 6 milliGRAM(s) IV Push daily  enoxaparin Injectable 40 milliGRAM(s) SubCutaneous daily  isosorbide   mononitrate ER Tablet (IMDUR) 120 milliGRAM(s) Oral daily  labetalol 150 milliGRAM(s) Oral three times a day  multivitamin 1 Tablet(s) Oral daily  saccharomyces boulardii 250 milliGRAM(s) Oral two times a day  sodium zirconium cyclosilicate 5 Gram(s) Oral once                            13.3   24.28 )-----------( 312      ( 02 Dec 2021 07:31 )             40.6     12-02    134<L>  |  98  |  33.0<H>  ----------------------------<  107<H>  4.6   |  23.0  |  1.01    Ca    9.7      02 Dec 2021 07:31    TPro  6.3<L>  /  Alb  3.7  /  TBili  0.3<L>  /  DBili  x   /  AST  29  /  ALT  17  /  AlkPhos  73  12-02    RECENT CULTURES:  11-30 @ 17:11          Detected      WBC Count: 24.28 K/uL (12-02-21 @ 07:31)  WBC Count: 16.11 K/uL (12-01-21 @ 06:19)  WBC Count: 24.34 K/uL (11-30-21 @ 17:11)    Creatinine, Serum: 1.01 mg/dL (12-02-21 @ 07:31)  Creatinine, Serum: 1.00 mg/dL (12-01-21 @ 06:19)  Creatinine, Serum: 1.05 mg/dL (11-30-21 @ 17:11)    C-Reactive Protein, Serum: 75 mg/L (12-01-21 @ 11:28)    Ferritin, Serum: 408 ng/mL (12-01-21 @ 11:28)      Procalcitonin, Serum: 0.61 ng/mL (12-01-21 @ 11:28)     Rapid RVP Result: Detected (11-30-21 @ 17:11)  SARS-CoV-2: Detected (11-30-21 @ 17:11)

## 2021-12-04 NOTE — PROGRESS NOTE ADULT - ASSESSMENT
89 year old female with PMH HTN, Dyslipidemia, CVA and CA Colon presented with chest pain, cough, weakness and reportedly passing out.  Hypoxic in ER 88% requiring supplemental O2  WBC 24, RVP (+) for ARIANNA-CoV2 and Enterovirus and Chest X-Ray with RLL infiltrate.      1. Acute Hypoxic respiratory failure secondary to underlying - improved, off supplemental O2   Pneumonia, RLL - multifactorial as aspiration on MBS. Blood culture (-)  Also with viral component given RVP(+).    Afebrile, Off O2, though elevated WBC (steroids)  - Supplemental O2, titrate to SpO2 88-94%, wean as tolerated  - Aspiration precautions, diet oreder placed as per SLP recommendations  To continue with  Levaquin for 4 more days   - Discontinue dexamethasone, Remdesivir given aspiration likely cause for pneumonia and not COVID-19  - Incentive spirometry, Chest PT      2. Syncope, with positive Orthostatic VS. No arrhythmia. Likely secondary to underlying pneumonia. Does have slightly low EF, daughter advised to follow up with Cardiologist - do not recommend any further testing currently     3. HTN  - Amlodipine, Labetalol, Isosorbide    4. Somnolence, likely delirium  - frequent reorientation, supportive care    5. VTEp - LMWH 40mg daily ( DDimer<2xUL, BMI 24)  PT, mobilize aggressively    6. Family doesn't want ARIANNA. Discharge home in next 24 hours       Stable for discharge. Awaiting transportation

## 2021-12-04 NOTE — PROGRESS NOTE ADULT - REASON FOR ADMISSION
syncope/ covid-19 infection

## 2021-12-06 LAB
CULTURE RESULTS: SIGNIFICANT CHANGE UP
CULTURE RESULTS: SIGNIFICANT CHANGE UP
SPECIMEN SOURCE: SIGNIFICANT CHANGE UP
SPECIMEN SOURCE: SIGNIFICANT CHANGE UP

## 2021-12-30 PROCEDURE — 93306 TTE W/DOPPLER COMPLETE: CPT

## 2021-12-30 PROCEDURE — 70450 CT HEAD/BRAIN W/O DYE: CPT | Mod: MA

## 2021-12-30 PROCEDURE — 80053 COMPREHEN METABOLIC PANEL: CPT

## 2021-12-30 PROCEDURE — 86140 C-REACTIVE PROTEIN: CPT

## 2021-12-30 PROCEDURE — 85379 FIBRIN DEGRADATION QUANT: CPT

## 2021-12-30 PROCEDURE — 92526 ORAL FUNCTION THERAPY: CPT

## 2021-12-30 PROCEDURE — 97163 PT EVAL HIGH COMPLEX 45 MIN: CPT

## 2021-12-30 PROCEDURE — 92610 EVALUATE SWALLOWING FUNCTION: CPT

## 2021-12-30 PROCEDURE — 94640 AIRWAY INHALATION TREATMENT: CPT

## 2021-12-30 PROCEDURE — 84145 PROCALCITONIN (PCT): CPT

## 2021-12-30 PROCEDURE — 80048 BASIC METABOLIC PNL TOTAL CA: CPT

## 2021-12-30 PROCEDURE — 92611 MOTION FLUOROSCOPY/SWALLOW: CPT

## 2021-12-30 PROCEDURE — 0225U NFCT DS DNA&RNA 21 SARSCOV2: CPT

## 2021-12-30 PROCEDURE — 81001 URINALYSIS AUTO W/SCOPE: CPT

## 2021-12-30 PROCEDURE — 96365 THER/PROPH/DIAG IV INF INIT: CPT

## 2021-12-30 PROCEDURE — 87040 BLOOD CULTURE FOR BACTERIA: CPT

## 2021-12-30 PROCEDURE — 36415 COLL VENOUS BLD VENIPUNCTURE: CPT

## 2021-12-30 PROCEDURE — 82728 ASSAY OF FERRITIN: CPT

## 2021-12-30 PROCEDURE — 85025 COMPLETE CBC W/AUTO DIFF WBC: CPT

## 2021-12-30 PROCEDURE — 74230 X-RAY XM SWLNG FUNCJ C+: CPT

## 2021-12-30 PROCEDURE — 99285 EMERGENCY DEPT VISIT HI MDM: CPT

## 2021-12-30 PROCEDURE — 93880 EXTRACRANIAL BILAT STUDY: CPT

## 2021-12-30 PROCEDURE — 71250 CT THORAX DX C-: CPT

## 2021-12-30 PROCEDURE — 71045 X-RAY EXAM CHEST 1 VIEW: CPT

## 2021-12-30 PROCEDURE — 93005 ELECTROCARDIOGRAM TRACING: CPT

## 2021-12-30 PROCEDURE — 85610 PROTHROMBIN TIME: CPT

## 2021-12-30 PROCEDURE — 84484 ASSAY OF TROPONIN QUANT: CPT

## 2022-07-05 NOTE — PHYSICAL THERAPY INITIAL EVALUATION ADULT - SIT-TO-STAND BALANCE
PRIMARY CARE VISIT        Patient name : Rose Jensen   MRN number: [id number]   YOB: 1954       Reason for visit:   Chief Complaint   Patient presents with   • Follow-up          Quality    Quality reviewed     History of Present Illness  Ms. Rose Jensen 68 years old woman came in for evaluation of the left hand fracture of the fifth metatarsal, patient had a fall while walking patient history of falls and injured her ankle 1 year ago, patient concerned x-ray shows the osteoporosis, denies any chest pain or shortness of breath no dyspnea on exertion patient following with rheumatology on Enbrel for her RA, patient concerned regarding the skin patches she is seen over the face and also over the extremities, home Accu-Cheks are good.  Denies any hypoglycemic episodes.   HPI  Review of Systems       Review of Systems   Constitutional: Negative for activity change, appetite change, chills, diaphoresis, fatigue, fever and unexpected weight change.   HENT: Negative for congestion, dental problem, drooling, ear discharge, ear pain, facial swelling, hearing loss, mouth sores, nosebleeds, postnasal drip, rhinorrhea, sinus pressure, sinus pain, sneezing, sore throat, tinnitus, trouble swallowing and voice change.    Eyes: Negative for photophobia, pain, discharge, redness, itching and visual disturbance.   Respiratory: Negative for apnea, cough, choking, chest tightness, shortness of breath, wheezing and stridor.    Cardiovascular: Negative for chest pain, palpitations and leg swelling.   Gastrointestinal: Negative for abdominal distention, abdominal pain, anal bleeding, blood in stool, constipation, diarrhea, nausea, rectal pain and vomiting.   Endocrine: Negative for cold intolerance, heat intolerance, polydipsia, polyphagia and polyuria.   Genitourinary: Negative for decreased urine volume, difficulty urinating, dyspareunia, dysuria, enuresis, flank pain, frequency, genital sores, hematuria, menstrual  problem, pelvic pain, urgency, vaginal bleeding, vaginal discharge and vaginal pain.   Musculoskeletal: Negative for arthralgias, back pain, gait problem, joint swelling, myalgias, neck pain and neck stiffness.   Skin: Negative for color change, pallor, rash and wound.   Allergic/Immunologic: Negative for environmental allergies, food allergies and immunocompromised state.   Neurological: Negative for dizziness, tremors, seizures, syncope, facial asymmetry, speech difficulty, weakness, light-headedness, numbness and headaches.   Hematological: Negative for adenopathy. Does not bruise/bleed easily.   Psychiatric/Behavioral: Negative for agitation, behavioral problems, confusion, decreased concentration, dysphoric mood, hallucinations, self-injury, sleep disturbance and suicidal ideas. The patient is not nervous/anxious and is not hyperactive.         Allergies  ALLERGIES:   Allergen Reactions   • Hydrocodone-Acetaminophen Other (See Comments)     Vicodine       Current Meds  Current Outpatient Medications   Medication Sig Dispense Refill   • Enbrel SureClick 50 MG/ML auto-injector injection INJECT 1 PEN UNDER THE SKIN EVERY 7 DAYS. 4 each 6   • furosemide (LASIX) 20 MG tablet TAKE 1 TABLET BY MOUTH EVERY DAY 90 tablet 1   • folic acid (FOLATE) 1 MG tablet Take 1 tablet by mouth daily. 90 tablet 3   • methotrexate 2.5 MG Tab Take 6 tablets by mouth 1 day a week. 78 tablet 1   • omeprazole (PriLOSEC) 40 MG capsule TAKE 1 CAPSULE BY MOUTH TWICE A  capsule 2   • gabapentin (NEURONTIN) 300 MG capsule Take 1 capsule by mouth 2 times daily. 180 capsule 3   • Levemir FlexTouch 100 UNIT/ML pen-injector INJECT 40 UNITS INTO THE SKIN NIGHTLY. PRIME 2 UNITS BEFORE EACH DOSE. 30 each 3   • amLODIPine (NORVASC) 5 MG tablet Take 1 tablet by mouth daily. 90 tablet 2   • BD Pen Needle Michell 2nd Gen 32G X 4 MM Misc USE TO INJECT INSULIN 2 TIMES DAILY. REMOVE NEEDLE COVER(S) TO EXPOSE NEEDLE BEFORE INJECTING. 200 each 1   •  lovastatin (MEVACOR) 20 MG tablet TAKE 1 TABLET BY MOUTH EVERY DAY AT NIGHT 90 tablet 3   • Ozempic, 0.25 or 0.5 MG/DOSE, 2 MG/1.5ML injection INJECT 0.25 MG INTO THE SKIN EVERY 7 DAYS. 1.5 mL 35   • ramipril (ALTACE) 10 MG capsule TAKE 1 CAPSULE BY MOUTH EVERY DAY 90 capsule 3   • Elastic Bandages & Supports (Wrist Splint/Cock-Up/Right M) Misc 1 each daily. Use during the day 1 each 0   • brimonidine (ALPHAGAN) 0.2 % ophthalmic solution Place 1 drop into both eyes 2 times daily.  4   • aspirin 81 MG tablet Take 81 mg by mouth daily.     • calcium carbonate-vitamin D (CALTRATE+D) 600-400 MG-UNIT per tablet Take 1 tablet by mouth daily.       No current facility-administered medications for this visit.     .       Past Medical History  Past Medical History:   Diagnosis Date   • Chronic kidney disease    • Diabetes mellitus (CMS/HCC)    • Diabetic neuropathy (CMS/HCC)    • Essential (primary) hypertension    • High cholesterol    • STEVEN on CPAP    • Seronegative rheumatoid arthritis (CMS/HCC) 5/5/2021       Surgical History  Past Surgical History:   Procedure Laterality Date   • Breast lumpectomy     • Eye surgery Bilateral    • Hysterectomy         Family History  Family History   Problem Relation Age of Onset   • Cancer, Colon Mother    • Hypertension Mother    • Cancer, Colon Maternal Grandmother    • Mesothelioma Father        Social History  Social History     Tobacco Use   • Smoking status: Never Smoker   • Smokeless tobacco: Never Used   Vaping Use   • Vaping Use: never used   Substance Use Topics   • Alcohol use: No   • Drug use: No               Vitals  Visit Vitals  /73 (BP Location: RUE - Right upper extremity, Patient Position: Sitting, Cuff Size: Large Adult)   Pulse 79   Temp 97.9 °F (36.6 °C) (Tympanic)   Resp 18   Ht 5' 4\" (1.626 m)   Wt 83.8 kg (184 lb 11.9 oz)   SpO2 99%   BMI 31.71 kg/m²              Physical Exam  Vitals and nursing note reviewed.   Constitutional:       General: She is not in  acute distress.     Appearance: Normal appearance. She is well-developed and normal weight. She is not ill-appearing, toxic-appearing or diaphoretic.   HENT:      Head: Normocephalic and atraumatic.      Right Ear: Tympanic membrane, ear canal and external ear normal. There is no impacted cerumen.      Left Ear: Tympanic membrane, ear canal and external ear normal. There is no impacted cerumen.      Nose: Nose normal. No congestion or rhinorrhea.      Mouth/Throat:      Mouth: Mucous membranes are moist.      Pharynx: Oropharynx is clear. No oropharyngeal exudate or posterior oropharyngeal erythema.   Eyes:      General: No scleral icterus.        Right eye: No discharge.         Left eye: No discharge.      Extraocular Movements: Extraocular movements intact.      Conjunctiva/sclera: Conjunctivae normal.      Pupils: Pupils are equal, round, and reactive to light.   Neck:      Thyroid: No thyromegaly.      Vascular: No carotid bruit or JVD.      Trachea: No tracheal deviation.   Cardiovascular:      Rate and Rhythm: Normal rate and regular rhythm.      Pulses: Normal pulses.      Heart sounds: Normal heart sounds. No murmur heard.    No friction rub. No gallop.   Pulmonary:      Effort: Pulmonary effort is normal. No respiratory distress.      Breath sounds: Normal breath sounds. No stridor. No wheezing, rhonchi or rales.   Chest:      Chest wall: No tenderness.   Abdominal:      General: Bowel sounds are normal. There is no distension.      Palpations: Abdomen is soft. There is no mass.      Tenderness: There is no abdominal tenderness. There is no guarding or rebound.      Hernia: No hernia is present.   Musculoskeletal:         General: No swelling, tenderness, deformity or signs of injury. Normal range of motion.      Cervical back: Normal range of motion and neck supple. No rigidity or tenderness.      Right lower leg: No edema.      Left lower leg: No edema.      Comments: Patient still on splint on the right  hand with a yane taping of the fourth and fifth fingers, still feels some uncomfortable if they take out the splint   Lymphadenopathy:      Cervical: No cervical adenopathy.   Skin:     General: Skin is dry.      Capillary Refill: Capillary refill takes less than 2 seconds.      Coloration: Skin is not jaundiced or pale.      Findings: No bruising, erythema, lesion or rash.   Neurological:      General: No focal deficit present.      Mental Status: She is alert and oriented to person, place, and time. Mental status is at baseline.      Cranial Nerves: No cranial nerve deficit.      Motor: No weakness or abnormal muscle tone.      Coordination: Coordination normal.      Gait: Gait normal.      Deep Tendon Reflexes: Reflexes are normal and symmetric.   Psychiatric:         Mood and Affect: Mood normal.         Behavior: Behavior normal.         Thought Content: Thought content normal.         Judgment: Judgment normal.          Results/Data    No visits with results within 4 Week(s) from this visit.   Latest known visit with results is:   Nurse Only on 04/13/2022   Component Date Value Ref Range Status   • BUN 04/13/2022 11  6 - 20 mg/dL Final   • Creatinine 04/13/2022 0.95  0.51 - 0.95 mg/dL Final   • Glomerular Filtration Rate 04/13/2022 71  >=60 Final    eGFR results = or >60 mL/min/1.73m2 = Normal kidney function. Estimated GFR calculated using the 2009 CKD-EPI creatinine equation.     • BUN/ Creatinine Ratio 04/13/2022 12  7 - 25 Final   • GOT/AST 04/13/2022 23  <=37 Units/L Final   • GPT/ALT 04/13/2022 25  <64 Units/L Final   • RBC Sedimentation Rate 04/13/2022 16  0 - 20 mm/hr Final   • WBC 04/13/2022 3.9 (A) 4.2 - 11.0 K/mcL Final   • RBC 04/13/2022 3.71 (A) 4.00 - 5.20 mil/mcL Final   • HGB 04/13/2022 11.5 (A) 12.0 - 15.5 g/dL Final   • HCT 04/13/2022 34.5 (A) 36.0 - 46.5 % Final   • MCV 04/13/2022 93.0  78.0 - 100.0 fl Final   • MCH 04/13/2022 31.0  26.0 - 34.0 pg Final   • MCHC 04/13/2022 33.3  32.0 - 36.5  g/dL Final   • RDW-CV 04/13/2022 13.5  11.0 - 15.0 % Final   • RDW-SD 04/13/2022 45.6  39.0 - 50.0 fL Final   • PLT 04/13/2022 234  140 - 450 K/mcL Final   • NRBC 04/13/2022 0  <=0 /100 WBC Final   • Neutrophil, Percent 04/13/2022 43  % Final    Auto diff verified by manual slide review   • Lymphocytes, Percent 04/13/2022 47  % Final   • Mono, Percent 04/13/2022 7  % Final   • Eosinophils, Percent 04/13/2022 2  % Final   • Basophils, Percent 04/13/2022 1  % Final   • Immature Granulocytes 04/13/2022 0  % Final   • Absolute Neutrophils 04/13/2022 1.7 (A) 1.8 - 7.7 K/mcL Final   • Absolute Lymphocytes 04/13/2022 1.9  1.0 - 4.0 K/mcL Final   • Absolute Monocytes 04/13/2022 0.3  0.3 - 0.9 K/mcL Final   • Absolute Eosinophils  04/13/2022 0.1  0.0 - 0.5 K/mcL Final   • Absolute Basophils 04/13/2022 0.0  0.0 - 0.3 K/mcL Final   • Absolute Immmature Granulocytes 04/13/2022 0.0  0.0 - 0.2 K/mcL Final       Immunizations  All up-to-date         Assessment/ PLAN  1. Osteopenia of the elderly  Referred to the endocrinology in view of osteoporosis noted in the x-rays may benefit from the biphosphonate's in view of her dizzy spells want to know whether it can be given IV any other options  - SERVICE TO ENDOCRINOLOGY  - COMPREHENSIVE METABOLIC PANEL; Future  - GLYCOHEMOGLOBIN; Future  - LIPID PANEL WITH REFLEX; Future  - MICROALBUMIN URINE RANDOM; Future  - VITAMIN B12; Future  - URINALYSIS & REFLEX MICROSCOPY WITH CULTURE IF INDICATED    2. Skin rash  Refer to dermatology for evaluation and advice on unexplained skin patches  - SERVICE TO DERMATOLOGY  - COMPREHENSIVE METABOLIC PANEL; Future  - GLYCOHEMOGLOBIN; Future  - LIPID PANEL WITH REFLEX; Future  - MICROALBUMIN URINE RANDOM; Future  - VITAMIN B12; Future  - URINALYSIS & REFLEX MICROSCOPY WITH CULTURE IF INDICATED    3. Age-related osteoporosis without current pathological fracture  As above follow with rheumatology and also with endocrinology  - COMPREHENSIVE METABOLIC  PANEL; Future  - GLYCOHEMOGLOBIN; Future  - LIPID PANEL WITH REFLEX; Future  - MICROALBUMIN URINE RANDOM; Future  - VITAMIN B12; Future  - URINALYSIS & REFLEX MICROSCOPY WITH CULTURE IF INDICATED    4. CKD stage 3 due to type 2 diabetes mellitus (CMS/[facility])  Monitor the labs avoid any nephrotoxic medications oral fluids as tolerable continue vitamin D3 supplements along with calcium supplements  - COMPREHENSIVE METABOLIC PANEL; Future  - GLYCOHEMOGLOBIN; Future  - LIPID PANEL WITH REFLEX; Future  - MICROALBUMIN URINE RANDOM; Future  - VITAMIN B12; Future  - URINALYSIS & REFLEX MICROSCOPY WITH CULTURE IF INDICATED    5. Diabetes mellitus due to underlying condition with diabetic neuropathy, with long-term current use of insulin (CMS/[facility])  Clinically stable continue present medications supportive care  - COMPREHENSIVE METABOLIC PANEL; Future  - GLYCOHEMOGLOBIN; Future  - LIPID PANEL WITH REFLEX; Future  - MICROALBUMIN URINE RANDOM; Future  - VITAMIN B12; Future  - URINALYSIS & REFLEX MICROSCOPY WITH CULTURE IF INDICATED    6. Benign essential hypertension  Clinically stable continue home blood pressure monitoring  - COMPREHENSIVE METABOLIC PANEL; Future  - GLYCOHEMOGLOBIN; Future  - LIPID PANEL WITH REFLEX; Future  - MICROALBUMIN URINE RANDOM; Future  - VITAMIN B12; Future  - URINALYSIS & REFLEX MICROSCOPY WITH CULTURE IF INDICATED    7. Obstructive sleep apnea syndrome  CPAP compliance as discussed  - COMPREHENSIVE METABOLIC PANEL; Future  - GLYCOHEMOGLOBIN; Future  - LIPID PANEL WITH REFLEX; Future  - MICROALBUMIN URINE RANDOM; Future  - VITAMIN B12; Future  - URINALYSIS & REFLEX MICROSCOPY WITH CULTURE IF INDICATED    Follow with urology consult as scheduled       RETURN TO OFFICE  Return in about 6 weeks (around 8/16/2022), or if symptoms worsen or fail to improve.        Benny Maxwell MD     fair balance

## 2022-08-27 ENCOUNTER — APPOINTMENT (OUTPATIENT)
Dept: ORTHOPEDIC SURGERY | Facility: CLINIC | Age: 87
End: 2022-08-27

## 2022-08-27 VITALS — WEIGHT: 121 LBS | BODY MASS INDEX: 20.66 KG/M2 | HEIGHT: 64 IN

## 2022-08-27 DIAGNOSIS — M76.821 POSTERIOR TIBIAL TENDINITIS, RIGHT LEG: ICD-10-CM

## 2022-08-27 DIAGNOSIS — M19.071 PRIMARY OSTEOARTHRITIS, RIGHT ANKLE AND FOOT: ICD-10-CM

## 2022-08-27 PROCEDURE — 73610 X-RAY EXAM OF ANKLE: CPT | Mod: RT

## 2022-08-27 PROCEDURE — 99203 OFFICE O/P NEW LOW 30 MIN: CPT

## 2022-08-27 NOTE — HISTORY OF PRESENT ILLNESS
[Sudden] : sudden [7] : 7 [0] : 0 [Occasional] : occasional [Household chores] : household chores [Rest] : rest [Retired] : Work status: retired [] : no [FreeTextEntry1] : right ankle [FreeTextEntry5] : Patient has been having on going pain on the right ankle for the past 4 days no specific injury to the ankle and no bruising  [de-identified] : walking

## 2022-08-27 NOTE — ASSESSMENT
[FreeTextEntry1] : Right post tibial tendinitis and right ankle OA (mild)\par - Pain improving since onset. Now able to weight bear. She uses walker regularly. Will continue to observe.\par - Rest, ice, Tylenol PRN. Cannot take NSAIDs.\par - Discussed CAM boot/ankle brace. Patient/son deferred.\par - PT rx provided. \par - Avoid painful activity.\par - F/u 3 weeks with Dr. Wills/Stephanie if pain persists.

## 2022-08-27 NOTE — PHYSICAL EXAM
[Mild] : mild swelling of medial ankle [4___] : eversion 4[unfilled]/5 [1+] : posterior tibialis pulse: 1+ [Right] : right ankle [] : negative Nino test [FreeTextEntry9] : No acute osseous abnormality appreciated. Mild ankle OA noted. Calcifications throughout vasculature present.  [de-identified] : plantar flexion 25 degrees [de-identified] : inversion 10 degrees [de-identified] : eversion 5 degrees [TWNoteComboBox7] : dorsiflexion 5 degrees

## 2023-02-09 ENCOUNTER — APPOINTMENT (OUTPATIENT)
Dept: ORTHOPEDIC SURGERY | Facility: CLINIC | Age: 88
End: 2023-02-09
Payer: MEDICARE

## 2023-02-09 VITALS — HEIGHT: 64 IN | BODY MASS INDEX: 20.66 KG/M2 | WEIGHT: 121 LBS

## 2023-02-09 PROCEDURE — 99214 OFFICE O/P EST MOD 30 MIN: CPT | Mod: 25

## 2023-02-09 PROCEDURE — 73564 X-RAY EXAM KNEE 4 OR MORE: CPT | Mod: LT

## 2023-02-09 PROCEDURE — J3490N: CUSTOM | Mod: NC

## 2023-02-09 PROCEDURE — 20611 DRAIN/INJ JOINT/BURSA W/US: CPT

## 2023-02-09 NOTE — HISTORY OF PRESENT ILLNESS
[Meds] : meds [Walking] : walking [Retired] : Work status: retired [de-identified] : 89F p/w L knee pain s/p fall 2 days ago. Pain normally from OA but increased since fall. Still able to ambulate but with more difficulty [] : no [FreeTextEntry1] : Left knee  [FreeTextEntry3] : 2/7/23 [FreeTextEntry5] : Patient son states she fell at home. Also states when she moves she starts to compain of pain but with rest the patient is okay.  [FreeTextEntry9] : Tylenol  [de-identified] : 2015 [de-identified] : Dr Deutsch  [de-identified] : Dr Deutsch

## 2023-02-09 NOTE — PHYSICAL EXAM
[4___] : hamstring 4[unfilled]/5 [] : uses wheelchair [Left] : left knee [There are no fractures, subluxations or dislocations. No significant abnormalities are seen] : There are no fractures, subluxations or dislocations. No significant abnormalities are seen [advanced tricompartmental OA with lateral compartment narrowing and valgus alignment] : advanced tricompartmental OA with lateral compartment narrowing and valgus alignment [TWNoteComboBox7] : flexion 115 degrees [de-identified] : extension 10 degrees

## 2023-02-09 NOTE — ASSESSMENT
[FreeTextEntry1] : 89F p/w adv L knee OA and contusion\par \par L knee CSI tolerated well\par defers PT\par No obvious fx on XR\par return as needed

## 2023-02-09 NOTE — PROCEDURE
[Large Joint Injection] : Large joint injection [Left] : of the left [Knee] : knee [Pain] : pain [Inflammation] : inflammation [X-ray evidence of Osteoarthritis on this or prior visit] : x-ray evidence of Osteoarthritis on this or prior visit [Alcohol] : alcohol [Betadine] : betadine [Ethyl Chloride sprayed topically] : ethyl chloride sprayed topically [Sterile technique used] : sterile technique used [___ cc    0.5%] : Bupivacaine (Marcaine) ~Vcc of 0.5%  [___ cc    40mg] : Triamcinolone (Kenalog) ~Vcc of 40 mg  [Call if redness, pain or fever occur] : call if redness, pain or fever occur [Apply ice for 15min out of every hour for the next 12-24 hours as tolerated] : apply ice for 15 minutes out of every hour for the next 12-24 hours as tolerated [Previous OTC use and PT nontherapeutic] : patient has tried OTC's including aspirin, Ibuprofen, Aleve, etc or prescription NSAIDS, and/or exercises at home and/or physical therapy without satisfactory response [Patient had decreased mobility in the joint] : patient had decreased mobility in the joint [Risks, benefits, alternatives discussed / Verbal consent obtained] : the risks benefits, and alternatives have been discussed, and verbal consent was obtained [Altered anatomic landmarks d/t erosive arthritis] : altered anatomic landmarks d/t erosive arthritis [All ultrasound images have been permanently captured and stored accordingly in our picture archiving and communication system] : All ultrasound images have been permanently captured and stored accordingly in our picture archiving and communication system [Visualization of the needle and placement of injection was performed without complication] : visualization of the needle and placement of injection was performed without complication

## 2023-02-21 NOTE — ED PROVIDER NOTE - ADMIT DISPOSITION PRESENT ON ADMISSION SEPSIS
Detail Level: Detailed Quality 226: Preventive Care And Screening: Tobacco Use: Screening And Cessation Intervention: Patient screened for tobacco use and is an ex/non-smoker Quality 110: Preventive Care And Screening: Influenza Immunization: Influenza Immunization previously received during influenza season No

## 2023-05-22 ENCOUNTER — OUTPATIENT (OUTPATIENT)
Dept: OUTPATIENT SERVICES | Facility: HOSPITAL | Age: 88
LOS: 1 days | End: 2023-05-22
Payer: MEDICARE

## 2023-05-22 DIAGNOSIS — Z98.42 CATARACT EXTRACTION STATUS, LEFT EYE: Chronic | ICD-10-CM

## 2023-05-22 DIAGNOSIS — Z90.81 ACQUIRED ABSENCE OF SPLEEN: Chronic | ICD-10-CM

## 2023-05-22 DIAGNOSIS — Z98.41 CATARACT EXTRACTION STATUS, RIGHT EYE: Chronic | ICD-10-CM

## 2023-05-22 DIAGNOSIS — R13.10 DYSPHAGIA, UNSPECIFIED: ICD-10-CM

## 2023-05-22 PROCEDURE — 74230 X-RAY XM SWLNG FUNCJ C+: CPT

## 2023-05-22 PROCEDURE — 74230 X-RAY XM SWLNG FUNCJ C+: CPT | Mod: 26

## 2023-05-22 NOTE — SWALLOW VFSS/MBS ASSESSMENT ADULT - SLP PERTINENT HISTORY OF CURRENT PROBLEM
Pt is a 89 y/o F with hx of HTN, HLD, colon CA, CVA. Past admission to [facility] in 2021 for SARS infection w/respiratory failure & aspiration PNA. MBSV completed in 12/2021, rx made for easy to chew solids w/moderately thick liquids via tsp (please see full report for details). As per family report, Pt d/c home & "was doing better" therefore family had discontinued mod thick liquids, returned to thin liquids. Pt without reported complications of thin liquid intake as per family, no pulmonary/respiratory compromise, no fevers/PNA. Recent c/o of L sided cervical pain, seen by ENT, questioning ?carotidynia. Pt present for repeat MBSV as per MD request

## 2023-05-22 NOTE — SWALLOW VFSS/MBS ASSESSMENT ADULT - SLP GENERAL OBSERVATIONS
Pt recd awake/kyphotic in wheelchair in radiology, A&A Ox1 w/cues, reduced cognition, significantly Circle, 0/10 pain pre/post, tolerating RA no overt distress, accompanied by children

## 2023-05-22 NOTE — SWALLOW VFSS/MBS ASSESSMENT ADULT - ORAL PHASE
within functional limits/Uncontrolled bolus / spillover in radha-pharynx/Uncontrolled bolus / spillover in hypopharynx Within functional limits/Uncontrolled bolus / spillover in radha-pharynx Uncontrolled bolus / spillover in hypopharynx within functional limits/Uncontrolled bolus / spillover in radha-pharynx Delayed oral transit time/Reduced anterior - posterior transport/Uncontrolled bolus / spillover in radha-pharynx

## 2023-05-22 NOTE — SWALLOW VFSS/MBS ASSESSMENT ADULT - MODE OF PRESENTATION
spoon/self fed/fed by clinician cup/spoon/self fed/fed by clinician self fed/fed by clinician Private car

## 2023-05-22 NOTE — SWALLOW VFSS/MBS ASSESSMENT ADULT - DIAGNOSTIC IMPRESSIONS
Pt presenting w/mild oral & moderate pharyngeal dysphagia likely impacted upon by overall debility w/further presbyphagia. Oral stage of swallow characterized by insufficient mastication w/mildly delayed A-P transfer of regular solid bolus. Improved/functional oral manipulation w/easy to chew. Premature pharyngeal entry due to decreased lingual strength/ROM, falling to the pyriforms w/thins, variably to the valleculae/pyriforms w/mildly thick liquids, more consistently to the valleculae w/foods. No anterior loss or oral stasis.  Pharyngeal phase of swallow notable for generally functional contractility, w/only trace BOT & vallecular stasis observed w/foods. Pt w/reduced hyo-laryngeal elevation/excursion, incomplete epiglottic deflection & inadequate upper/lower airway protection. Pt w/silent aspiration of thin liquids during swallow w/thin liquids via cup sip. No benefit w/further penetration to the level of the cords without clearance during the swallow w/thin via tsp. Additional single episode of aspiration with sensation during swallow w/puree via full tsp. No further incident noted w/small presentation via 1/2 tsp. No further penetration or aspiration demonstrated w/remaining trials across consistencies.        Although Pt w/technical improvement in presentation from previous MBSV in 12/2021, Pt will ultimately remain at risk for aspiration due to age & hx. Swallow profile fluctuating over the course of a short study, will likely have further fluctuations over the course of a meal. Rx adherence to strict aspiration precautions. Consider conversation w/MD re: GOC for nutrition/hydration. Pt's family w/concern re: dehydration. Risk vs benefit analysis re: continuation of thickening liquids.

## 2023-05-22 NOTE — SWALLOW VFSS/MBS ASSESSMENT ADULT - ROSENBEK'S PENETRATION ASPIRATION SCALE
(1) no aspiration, contrast does not enter airway observed during swallow full tsp only x 1 only ; 1- no penetration or aspiration with 1/2 tsp w/remaining trials x 3/(8) contrast passes glottis, visible subglottic residue remains, absent patient response (aspiration) during swallow via cup; 5-penetration to the cords during swallow via tsp/(8) contrast passes glottis, visible subglottic residue remains, absent patient response (aspiration)

## 2023-05-22 NOTE — SWALLOW VFSS/MBS ASSESSMENT ADULT - RECOMMENDED FEEDING/EATING TECHNIQUES
crush medication (when feasible)/maintain upright posture during/after eating for 30 mins/oral hygiene/position upright (90 degrees)/provide rest periods between swallows/small sips/bites

## 2023-06-01 NOTE — ED ADULT NURSE NOTE - NS_SISCREENINGSR_GEN_ALL_ED
Negative
Post-Care Instructions: I reviewed with the patient in detail post-care instructions. Patient is to wear sunprotection, and avoid picking at any of the treated lesions. Pt may apply Vaseline to crusted or scabbing areas.
Medical Necessity Clause: This procedure was medically necessary because the lesions that were treated were:
Add 52 Modifier (Optional): no
Consent: The patient's consent was obtained including but not limited to risks of crusting, scabbing, blistering, scarring, darker or lighter pigmentary change, recurrence, incomplete removal and infection.
Medical Necessity Information: It is in your best interest to select a reason for this procedure from the list below. All of these items fulfill various CMS LCD requirements except the new and changing color options.
Anesthesia Volume In Cc: 0.5
Detail Level: Detailed
Treatment Number (Will Not Render If 0): 0

## 2023-06-04 ENCOUNTER — INPATIENT (INPATIENT)
Facility: HOSPITAL | Age: 88
LOS: 3 days | Discharge: HOSPICE | DRG: 689 | End: 2023-06-08
Admitting: INTERNAL MEDICINE
Payer: MEDICARE

## 2023-06-04 VITALS
WEIGHT: 132.28 LBS | RESPIRATION RATE: 12 BRPM | DIASTOLIC BLOOD PRESSURE: 80 MMHG | SYSTOLIC BLOOD PRESSURE: 151 MMHG | HEIGHT: 62 IN | TEMPERATURE: 98 F | OXYGEN SATURATION: 95 % | HEART RATE: 84 BPM

## 2023-06-04 DIAGNOSIS — Z90.81 ACQUIRED ABSENCE OF SPLEEN: Chronic | ICD-10-CM

## 2023-06-04 DIAGNOSIS — Z98.41 CATARACT EXTRACTION STATUS, RIGHT EYE: Chronic | ICD-10-CM

## 2023-06-04 DIAGNOSIS — Z98.42 CATARACT EXTRACTION STATUS, LEFT EYE: Chronic | ICD-10-CM

## 2023-06-04 DIAGNOSIS — N39.0 URINARY TRACT INFECTION, SITE NOT SPECIFIED: ICD-10-CM

## 2023-06-04 LAB
ALBUMIN SERPL ELPH-MCNC: 3.5 G/DL — SIGNIFICANT CHANGE UP (ref 3.3–5.2)
ALP SERPL-CCNC: 110 U/L — SIGNIFICANT CHANGE UP (ref 40–120)
ALT FLD-CCNC: 22 U/L — SIGNIFICANT CHANGE UP
ANION GAP SERPL CALC-SCNC: 21 MMOL/L — HIGH (ref 5–17)
APPEARANCE UR: ABNORMAL
APTT BLD: 27.4 SEC — LOW (ref 27.5–35.5)
AST SERPL-CCNC: 32 U/L — HIGH
BACTERIA # UR AUTO: ABNORMAL
BASOPHILS # BLD AUTO: 0 K/UL — SIGNIFICANT CHANGE UP (ref 0–0.2)
BASOPHILS NFR BLD AUTO: 0 % — SIGNIFICANT CHANGE UP (ref 0–2)
BILIRUB SERPL-MCNC: 0.4 MG/DL — SIGNIFICANT CHANGE UP (ref 0.4–2)
BILIRUB UR-MCNC: ABNORMAL
BUN SERPL-MCNC: 159 MG/DL — HIGH (ref 8–20)
CALCIUM SERPL-MCNC: 10.4 MG/DL — SIGNIFICANT CHANGE UP (ref 8.4–10.5)
CHLORIDE SERPL-SCNC: 101 MMOL/L — SIGNIFICANT CHANGE UP (ref 96–108)
CO2 SERPL-SCNC: 14 MMOL/L — LOW (ref 22–29)
COLOR SPEC: YELLOW — SIGNIFICANT CHANGE UP
CREAT SERPL-MCNC: 3.35 MG/DL — HIGH (ref 0.5–1.3)
DIFF PNL FLD: ABNORMAL
EGFR: 12 ML/MIN/1.73M2 — LOW
EOSINOPHIL # BLD AUTO: 0 K/UL — SIGNIFICANT CHANGE UP (ref 0–0.5)
EOSINOPHIL NFR BLD AUTO: 0 % — SIGNIFICANT CHANGE UP (ref 0–6)
EPI CELLS # UR: SIGNIFICANT CHANGE UP
GIANT PLATELETS BLD QL SMEAR: PRESENT — SIGNIFICANT CHANGE UP
GLUCOSE SERPL-MCNC: 132 MG/DL — HIGH (ref 70–99)
GLUCOSE UR QL: NEGATIVE — SIGNIFICANT CHANGE UP
HCT VFR BLD CALC: 33.2 % — LOW (ref 34.5–45)
HCT VFR BLD CALC: 37.1 % — SIGNIFICANT CHANGE UP (ref 34.5–45)
HGB BLD-MCNC: 10.6 G/DL — LOW (ref 11.5–15.5)
HGB BLD-MCNC: 12 G/DL — SIGNIFICANT CHANGE UP (ref 11.5–15.5)
INR BLD: 1.03 RATIO — SIGNIFICANT CHANGE UP (ref 0.88–1.16)
KETONES UR-MCNC: ABNORMAL
LEUKOCYTE ESTERASE UR-ACNC: ABNORMAL
LYMPHOCYTES # BLD AUTO: 2.6 K/UL — SIGNIFICANT CHANGE UP (ref 1–3.3)
LYMPHOCYTES # BLD AUTO: 8.7 % — LOW (ref 13–44)
MANUAL SMEAR VERIFICATION: SIGNIFICANT CHANGE UP
MCHC RBC-ENTMCNC: 26.7 PG — LOW (ref 27–34)
MCHC RBC-ENTMCNC: 27.2 PG — SIGNIFICANT CHANGE UP (ref 27–34)
MCHC RBC-ENTMCNC: 31.9 GM/DL — LOW (ref 32–36)
MCHC RBC-ENTMCNC: 32.3 GM/DL — SIGNIFICANT CHANGE UP (ref 32–36)
MCV RBC AUTO: 82.4 FL — SIGNIFICANT CHANGE UP (ref 80–100)
MCV RBC AUTO: 85.3 FL — SIGNIFICANT CHANGE UP (ref 80–100)
MONOCYTES # BLD AUTO: 1.29 K/UL — HIGH (ref 0–0.9)
MONOCYTES NFR BLD AUTO: 4.3 % — SIGNIFICANT CHANGE UP (ref 2–14)
NEUTROPHILS # BLD AUTO: 26.04 K/UL — HIGH (ref 1.8–7.4)
NEUTROPHILS NFR BLD AUTO: 87 % — HIGH (ref 43–77)
NITRITE UR-MCNC: POSITIVE
PH UR: 5 — SIGNIFICANT CHANGE UP (ref 5–8)
PLAT MORPH BLD: NORMAL — SIGNIFICANT CHANGE UP
PLATELET # BLD AUTO: 392 K/UL — SIGNIFICANT CHANGE UP (ref 150–400)
PLATELET # BLD AUTO: 427 K/UL — HIGH (ref 150–400)
POTASSIUM SERPL-MCNC: 5.5 MMOL/L — HIGH (ref 3.5–5.3)
POTASSIUM SERPL-SCNC: 5.5 MMOL/L — HIGH (ref 3.5–5.3)
PROT SERPL-MCNC: 7 G/DL — SIGNIFICANT CHANGE UP (ref 6.6–8.7)
PROT UR-MCNC: 30 MG/DL
PROTHROM AB SERPL-ACNC: 11.9 SEC — SIGNIFICANT CHANGE UP (ref 10.5–13.4)
RBC # BLD: 3.89 M/UL — SIGNIFICANT CHANGE UP (ref 3.8–5.2)
RBC # BLD: 4.5 M/UL — SIGNIFICANT CHANGE UP (ref 3.8–5.2)
RBC # FLD: 15.1 % — HIGH (ref 10.3–14.5)
RBC # FLD: 15.4 % — HIGH (ref 10.3–14.5)
RBC BLD AUTO: NORMAL — SIGNIFICANT CHANGE UP
RBC CASTS # UR COMP ASSIST: >50 /HPF (ref 0–4)
SODIUM SERPL-SCNC: 135 MMOL/L — SIGNIFICANT CHANGE UP (ref 135–145)
SP GR SPEC: 1.01 — SIGNIFICANT CHANGE UP (ref 1.01–1.02)
UROBILINOGEN FLD QL: NEGATIVE — SIGNIFICANT CHANGE UP
WBC # BLD: 29.13 K/UL — HIGH (ref 3.8–10.5)
WBC # BLD: 29.93 K/UL — HIGH (ref 3.8–10.5)
WBC # FLD AUTO: 29.13 K/UL — HIGH (ref 3.8–10.5)
WBC # FLD AUTO: 29.93 K/UL — HIGH (ref 3.8–10.5)
WBC UR QL: >50 /HPF (ref 0–5)

## 2023-06-04 PROCEDURE — 93010 ELECTROCARDIOGRAM REPORT: CPT

## 2023-06-04 PROCEDURE — 99223 1ST HOSP IP/OBS HIGH 75: CPT

## 2023-06-04 PROCEDURE — 71045 X-RAY EXAM CHEST 1 VIEW: CPT | Mod: 26

## 2023-06-04 PROCEDURE — 70450 CT HEAD/BRAIN W/O DYE: CPT | Mod: 26,MA

## 2023-06-04 PROCEDURE — 74176 CT ABD & PELVIS W/O CONTRAST: CPT | Mod: 26

## 2023-06-04 PROCEDURE — 99497 ADVNCD CARE PLAN 30 MIN: CPT | Mod: 25

## 2023-06-04 PROCEDURE — 99285 EMERGENCY DEPT VISIT HI MDM: CPT | Mod: GC

## 2023-06-04 RX ORDER — SODIUM CHLORIDE 9 MG/ML
1000 INJECTION INTRAMUSCULAR; INTRAVENOUS; SUBCUTANEOUS ONCE
Refills: 0 | Status: COMPLETED | OUTPATIENT
Start: 2023-06-04 | End: 2023-06-04

## 2023-06-04 RX ORDER — ACETAMINOPHEN 500 MG
650 TABLET ORAL EVERY 6 HOURS
Refills: 0 | Status: DISCONTINUED | OUTPATIENT
Start: 2023-06-04 | End: 2023-06-06

## 2023-06-04 RX ORDER — LANOLIN ALCOHOL/MO/W.PET/CERES
3 CREAM (GRAM) TOPICAL AT BEDTIME
Refills: 0 | Status: DISCONTINUED | OUTPATIENT
Start: 2023-06-04 | End: 2023-06-06

## 2023-06-04 RX ORDER — LABETALOL HCL 100 MG
150 TABLET ORAL EVERY 8 HOURS
Refills: 0 | Status: DISCONTINUED | OUTPATIENT
Start: 2023-06-04 | End: 2023-06-06

## 2023-06-04 RX ORDER — SODIUM CHLORIDE 9 MG/ML
1000 INJECTION INTRAMUSCULAR; INTRAVENOUS; SUBCUTANEOUS
Refills: 0 | Status: DISCONTINUED | OUTPATIENT
Start: 2023-06-04 | End: 2023-06-05

## 2023-06-04 RX ORDER — AMLODIPINE BESYLATE 2.5 MG/1
5 TABLET ORAL DAILY
Refills: 0 | Status: DISCONTINUED | OUTPATIENT
Start: 2023-06-04 | End: 2023-06-06

## 2023-06-04 RX ORDER — TRAMADOL HYDROCHLORIDE 50 MG/1
50 TABLET ORAL DAILY
Refills: 0 | Status: DISCONTINUED | OUTPATIENT
Start: 2023-06-04 | End: 2023-06-06

## 2023-06-04 RX ORDER — PIPERACILLIN AND TAZOBACTAM 4; .5 G/20ML; G/20ML
3.38 INJECTION, POWDER, LYOPHILIZED, FOR SOLUTION INTRAVENOUS EVERY 12 HOURS
Refills: 0 | Status: DISCONTINUED | OUTPATIENT
Start: 2023-06-04 | End: 2023-06-08

## 2023-06-04 RX ORDER — ASPIRIN/CALCIUM CARB/MAGNESIUM 324 MG
81 TABLET ORAL DAILY
Refills: 0 | Status: DISCONTINUED | OUTPATIENT
Start: 2023-06-04 | End: 2023-06-06

## 2023-06-04 RX ORDER — HEPARIN SODIUM 5000 [USP'U]/ML
5000 INJECTION INTRAVENOUS; SUBCUTANEOUS EVERY 12 HOURS
Refills: 0 | Status: DISCONTINUED | OUTPATIENT
Start: 2023-06-04 | End: 2023-06-07

## 2023-06-04 RX ORDER — ISOSORBIDE MONONITRATE 60 MG/1
60 TABLET, EXTENDED RELEASE ORAL DAILY
Refills: 0 | Status: DISCONTINUED | OUTPATIENT
Start: 2023-06-04 | End: 2023-06-06

## 2023-06-04 RX ORDER — ONDANSETRON 8 MG/1
4 TABLET, FILM COATED ORAL EVERY 8 HOURS
Refills: 0 | Status: DISCONTINUED | OUTPATIENT
Start: 2023-06-04 | End: 2023-06-08

## 2023-06-04 RX ORDER — PIPERACILLIN AND TAZOBACTAM 4; .5 G/20ML; G/20ML
3.38 INJECTION, POWDER, LYOPHILIZED, FOR SOLUTION INTRAVENOUS ONCE
Refills: 0 | Status: COMPLETED | OUTPATIENT
Start: 2023-06-04 | End: 2023-06-04

## 2023-06-04 RX ADMIN — SODIUM CHLORIDE 1000 MILLILITER(S): 9 INJECTION INTRAMUSCULAR; INTRAVENOUS; SUBCUTANEOUS at 21:17

## 2023-06-04 RX ADMIN — PIPERACILLIN AND TAZOBACTAM 200 GRAM(S): 4; .5 INJECTION, POWDER, LYOPHILIZED, FOR SOLUTION INTRAVENOUS at 19:31

## 2023-06-04 NOTE — ED ADULT NURSE NOTE - OBJECTIVE STATEMENT
Pt arrives to ED sent in by family for worsening ams, poor po intake since 5/23.  As per son, pt had swallow evaluation 5/22 and states "she started to get worse after that."  Daughter states that for the past 4 days pt has become "less energetic and responsive."  Pt A&Ox1(which is baseline) with garbled speech, dry mouth and unable to follow commands.  Pressure ulcer noted to right sacrum, stage I; left gluteal region, stage II; DTI noted to posterior left heel; Right upper medial thigh, stage II.  NSR on CM.  Respirations even and unlabored on room air.

## 2023-06-04 NOTE — H&P ADULT - NSHPPHYSICALEXAM_GEN_ALL_CORE
Vital Signs Last 24 Hrs  T(C): 36.4 (04 Jun 2023 19:08), Max: 36.4 (04 Jun 2023 17:19)  T(F): 97.6 (04 Jun 2023 19:08), Max: 97.6 (04 Jun 2023 19:08)  HR: 80 (04 Jun 2023 19:08) (80 - 84)  BP: 124/63 (04 Jun 2023 19:08) (124/63 - 151/80)  BP(mean): --  RR: 19 (04 Jun 2023 19:08) (12 - 19)  SpO2: 97% (04 Jun 2023 19:08) (95% - 97%)    Parameters below as of 04 Jun 2023 19:08  Patient On (Oxygen Delivery Method): room air    Constitutional: Elderly, NAD, VSS  Head: NC/AT  Eyes: PERRL, EOMI, anicteric sclera, conjunctiva WNL  ENT: Normal Pharynx, No tonsillar exudate/erythema, +Dry Oral Mucosa  Neck: Supple, Non-tender  Chest: Non-tender, no rashes  Cardio: RRR, s1/s2, no appreciable murmurs/rubs/gallops  Resp: BS CTA bilaterally, no wheezing/rhonchi/rales  Abd: Soft, Non-tender, Non-distended, no rebound/guarding/rigidity  : not examined  Rectal: not examined  MSK: moving all extremities, no motor weakness, full ROM x4  Ext: palpable distal pulses, good capillary refill, no clubbing/cyanosis/edema  Psych: appropriate, cooperative, confused, AAOx1  Neuro: CN II-XII grossly intact, no focal deficits  Skin: Warm/Dry. No rashes.

## 2023-06-04 NOTE — ED ADULT TRIAGE NOTE - CHIEF COMPLAINT QUOTE
BIBEMS from home for 2 weeks of poor PO intake, weakness and altered mental status. Son states she was seen in the hospital 2 weeks for a swallow evaluation and declined progressively after being discharged. As per son, she hasn't been eating consistently for 2 weeks and has had difficulty taking her medication. In triage, pt is not answering questions and is moaning. Son denies falls/trauma. MD called to bedside to evaluate, patient taken to CT scan.

## 2023-06-04 NOTE — ED PROVIDER NOTE - CLINICAL SUMMARY MEDICAL DECISION MAKING FREE TEXT BOX
89 yo female presents with declining status at home, will not swallow water today. Family, healthcare proxy, would like medical workup for patient at this time. She is DNR/DNI. Will obtain ct head, labs, urinalysis, ekg, cxr. Monitor and reassess.

## 2023-06-04 NOTE — H&P ADULT - ASSESSMENT
ASSESSMENT:  89F with PMHX CVA, CAD, HTN, Chronic Pain, Colon CA presents to [facility] ER with FTT for past 2 weeks admitted for AMS 2/2 Acute Complicated UTI, ARF, Hyperkalemia, AGMA, and FTT.    PLAN:  Acute Complicated UTI  -Admit to Tele  -Repeat Labs  -Trend Leukocytosis  -UA Positive. UCX Pending  -BCX x2 Pending  -Zosyn 3.375g q12 renal dosing    AMS  -Likely 2/2 Uremia 2/2 ARF and/or UTI  -CTH WO negative for acute findings  -Continue plan for UTI and ARF    Hyperkalemia 2/2 ARF and AGMA  -K 5.5 with Cr 3.35 no reported hx CKD  -Appears pre-renal very dry oral mucosa and low UOP with reported hx FTT by family  -1L IVFB NSS in ED   -IVF NSS 75cc/hr  -Repeat Labs  -Trend BMP  -Renal Dose Meds  -Avoid Nephrotoxins  -Check Urine Studies  -Check Bladder Scan x1  -Check CT ABDPEL WO r/o stone v obstruction  -Repeat Labs for mild hyperkalemia s/p IVFB     Dysphagia, FTT  -NPO except meds  -Bedside Dysphagia screen  -Nutrition Consult    CVA, CAD, HTN  -Amlodipine 5mg q24  -Labetalol 150mg TID   -ASA 81mg q24 (no longer on Aggrenox)  -Imdur 60mg q24    Chronic Pain  -Tramadol 50mg PRN    Goals of Care  -Discussed GOC with Family at bedside son and daughter  -Daughter is HCP Charla Stanley   -Son is primary caregiver at home  -RIO discussed and re-done patient DNR/DNI but okay for other interventions at this time  -Family likely not to desire heroic measures would like to discuss options going forward given advanced age and recent FTT.   -Palliative Care Consulted

## 2023-06-04 NOTE — H&P ADULT - NSHPLABSRESULTS_GEN_ALL_CORE
CTH WO IMPRESSION:  No evidence of acute transcortical infarct, acute intracranial   hemorrhage, or mass effect.    CT ABDPEL WO Pending

## 2023-06-04 NOTE — H&P ADULT - REASON FOR ADMISSION
AMS, UTI, Hyperkalemia, ARF, AGMA, FTT [Follow-up Visit ___] : a follow-up visit  for [unfilled] [Spouse] : spouse

## 2023-06-04 NOTE — ED PROVIDER NOTE - ATTENDING CONTRIBUTION TO CARE
Renetta: I performed a face to face evaluation of this patient and performed a full history and physical examination on the patient.  I agree with the resident's history, physical examination, and plan of the patient unless otherwise noted. My brief assessment is as follows: pmh as documented with progressive decline in functional status more acutely over past few weeks. had barium study on 5/23, as noted in system, eating/drinking less. hasn't been able to speak past few days, bed bound. pt cannot vocalize any complaints. no vomiting/diarrhea. per son/daughter Rhett/Nerissa, dnr/dni, see GOC note. pt with mouth open, moves eyes, not following commands, dry mucus membranes, not answering questions, rrr, ctab, nl rate/effort, benign abd, minimal swelling b/l LE. ekg old LBBB. will check labs, eval for aspiration pna given higher risk, uti, family agreeabl eto fluids/abx, palliative care and family would like ultimate hospice if not improving.

## 2023-06-04 NOTE — ED PROVIDER NOTE - OBJECTIVE STATEMENT
89 yo female history of Colon CA, HTN, Stroke presents from home w two weeks of gradual decline. Per son, at bedside, patient has been experiencing declining status for the past two weeks. Today am, she would not swallow water prompting son to bring her to ER. She has not had fever, or been coughing at home. She did not complain to her son about any pain pta at the ER. Patient A/O x 1 at this time, additional history limited secondary to current clinical condition.

## 2023-06-04 NOTE — GOALS OF CARE CONVERSATION - ADVANCED CARE PLANNING - CONVERSATION/DISCUSSION
Prognosis/MOLST Discussed/Treatment Options
Diagnosis/Prognosis/MOLST Discussed/Treatment Options/Palliative Care Referral

## 2023-06-04 NOTE — ED PROVIDER NOTE - PHYSICAL EXAMINATION
Gen: NAD.   Head: NC/AT  Neck: trachea midline  HEENT: DMM.   Resp:  No distress, lungs cta b/l  Cardiac: Heart rrr. No murmur.   Abdomen: Soft, nontender, nondistended.   Ext: no deformities  Neuro:  A&Ox0. Nonfocal exam.   Skin:  Warm and dry as visualized

## 2023-06-04 NOTE — ED ADULT NURSE REASSESSMENT NOTE - NS ED NURSE REASSESS COMMENT FT1
Assumed pt care 1900. Report received from ED RN Bessie Garcia. Charting as noted. A&O1 at baseline. PT is pending lab/urine results. Denies pain at this time. NAD. Respirations even and unlabored. IV intact. Pt and son made aware of plan of care and verbalized understanding.

## 2023-06-04 NOTE — H&P ADULT - HISTORY OF PRESENT ILLNESS
89F with PMHX CVA, CAD, HTN, Chronic Pain, Colon CA presents to [facility] ER with FTT for past 2 weeks. Patient with hx Dysphagia but now no longer tolerating water intake per son (primary caregiver) at bedside. Patient with AMS unable to provdie HPI/ROS. HCP Daughter Charla Stanley providing HPI/ROS. Patient noted to have significant leukocytosis, ARF, AGMA, and Hyperkalemia on labwork. UA positive for UTI. Cultures pending. Started on empiric IV ABX And IVF. GOC discussed with family at bedside. MOLST re-done. Confirmed patient is DNR/DNI but ok for other interventions. Would like assistance of palliative care going forward to discuss options pending current clinical status.     ROS limited 2/2 AMS.    PMHX: CVA, CAD, HTN, Chronic Pain, Colon CA  PSHX: Unknown  FamHx: Denies fam hx HTN  Social Hx: Denies etoh/tobacco/drug abuse

## 2023-06-04 NOTE — ED ADULT NURSE NOTE - NSFALLHARMRISKINTERV_ED_ALL_ED
Assistance OOB with selected safe patient handling equipment if applicable/Assistance with ambulation/Communicate risk of Fall with Harm to all staff, patient, and family/Monitor gait and stability/Monitor for mental status changes and reorient to person, place, and time, as needed/Move patient closer to nursing station/within visual sight of ED staff/Provide visual cue: red socks, yellow wristband, yellow gown, etc/Reinforce activity limits and safety measures with patient and family/Toileting schedule using arm’s reach rule for commode and bathroom/Use of alarms - bed, stretcher, chair and/or video monitoring/Bed in lowest position, wheels locked, appropriate side rails in place/Call bell, personal items and telephone in reach/Instruct patient to call for assistance before getting out of bed/chair/stretcher/Non-slip footwear applied when patient is off stretcher/[place] to call system/Physically safe environment - no spills, clutter or unnecessary equipment/Purposeful Proactive Rounding/Room/bathroom lighting operational, light cord in reach

## 2023-06-04 NOTE — ED ADULT NURSE REASSESSMENT NOTE - NS ED NURSE REASSESS COMMENT FT1
assumed care of pt from covering RN Tashia at 20:20.  abx running at this time. unable to obtain blood cultures. MD Xiong made aware. at bedside to obtain blood cultures.

## 2023-06-04 NOTE — ED PROVIDER NOTE - NSICDXPASTMEDICALHX_GEN_ALL_CORE_FT
PAST MEDICAL HISTORY:  Angina at rest     HTN (hypertension)     Malignant neoplasm of colon, unspecified part of colon     Stroke x2. as per pt's son mini stroke and did not have any residual deficits

## 2023-06-04 NOTE — GOALS OF CARE CONVERSATION - ADVANCED CARE PLANNING - CONVERSATION DETAILS
Both children and molst confirm would like fluids/abx as needed, nothing more than that, nothing invasive. understands overall prognosis and pt unlikely to return to significnat f unctional status. children would ultimately like hospice evaluation and palliative care evaluation after trial of fluids/abx.
Explained current clinical status and prognosis and plan of care to family (son and daughter) at bedside. GOC also discussed. Daughter is HCP Charla Moralesthomas . Son is primary caregiver at home. Patient deteriorating for past 2 weeks progressive dysphagia and FTT now admitted for AMS/UTI/ARF/AGMA/Hyperkalemia. MOL discussed and re-done patient DNR/DNI but okay for other interventions at this time. Copies scanned and given to family. Family likely not to desire heroic measures would like to discuss options going forward given advanced age and recent FTT. Would like to discuss further with Palliative Care. Consult placed.

## 2023-06-05 LAB
ALBUMIN SERPL ELPH-MCNC: 3 G/DL — LOW (ref 3.3–5.2)
ALP SERPL-CCNC: 102 U/L — SIGNIFICANT CHANGE UP (ref 40–120)
ALT FLD-CCNC: 18 U/L — SIGNIFICANT CHANGE UP
ANION GAP SERPL CALC-SCNC: 19 MMOL/L — HIGH (ref 5–17)
ANION GAP SERPL CALC-SCNC: 20 MMOL/L — HIGH (ref 5–17)
APPEARANCE UR: ABNORMAL
AST SERPL-CCNC: 28 U/L — SIGNIFICANT CHANGE UP
BACTERIA # UR AUTO: ABNORMAL
BASOPHILS # BLD AUTO: 0.09 K/UL — SIGNIFICANT CHANGE UP (ref 0–0.2)
BASOPHILS NFR BLD AUTO: 0.3 % — SIGNIFICANT CHANGE UP (ref 0–2)
BILIRUB SERPL-MCNC: 0.4 MG/DL — SIGNIFICANT CHANGE UP (ref 0.4–2)
BILIRUB UR-MCNC: ABNORMAL
BUN SERPL-MCNC: 157.1 MG/DL — HIGH (ref 8–20)
BUN SERPL-MCNC: 158.1 MG/DL — HIGH (ref 8–20)
CALCIUM SERPL-MCNC: 9.4 MG/DL — SIGNIFICANT CHANGE UP (ref 8.4–10.5)
CALCIUM SERPL-MCNC: 9.6 MG/DL — SIGNIFICANT CHANGE UP (ref 8.4–10.5)
CHLORIDE SERPL-SCNC: 103 MMOL/L — SIGNIFICANT CHANGE UP (ref 96–108)
CHLORIDE SERPL-SCNC: 104 MMOL/L — SIGNIFICANT CHANGE UP (ref 96–108)
CO2 SERPL-SCNC: 11 MMOL/L — LOW (ref 22–29)
CO2 SERPL-SCNC: 14 MMOL/L — LOW (ref 22–29)
COLOR SPEC: YELLOW — SIGNIFICANT CHANGE UP
CREAT ?TM UR-MCNC: 73 MG/DL — SIGNIFICANT CHANGE UP
CREAT SERPL-MCNC: 3.14 MG/DL — HIGH (ref 0.5–1.3)
CREAT SERPL-MCNC: 3.16 MG/DL — HIGH (ref 0.5–1.3)
DIFF PNL FLD: ABNORMAL
EGFR: 13 ML/MIN/1.73M2 — LOW
EGFR: 13 ML/MIN/1.73M2 — LOW
EOSINOPHIL # BLD AUTO: 0.01 K/UL — SIGNIFICANT CHANGE UP (ref 0–0.5)
EOSINOPHIL NFR BLD AUTO: 0 % — SIGNIFICANT CHANGE UP (ref 0–6)
EPI CELLS # UR: SIGNIFICANT CHANGE UP
GLUCOSE SERPL-MCNC: 126 MG/DL — HIGH (ref 70–99)
GLUCOSE SERPL-MCNC: 129 MG/DL — HIGH (ref 70–99)
GLUCOSE UR QL: NEGATIVE MG/DL — SIGNIFICANT CHANGE UP
GRAM STN FLD: SIGNIFICANT CHANGE UP
HCT VFR BLD CALC: 34.2 % — LOW (ref 34.5–45)
HGB BLD-MCNC: 11.2 G/DL — LOW (ref 11.5–15.5)
IMM GRANULOCYTES NFR BLD AUTO: 1 % — HIGH (ref 0–0.9)
KETONES UR-MCNC: NEGATIVE — SIGNIFICANT CHANGE UP
LEUKOCYTE ESTERASE UR-ACNC: ABNORMAL
LYMPHOCYTES # BLD AUTO: 2.39 K/UL — SIGNIFICANT CHANGE UP (ref 1–3.3)
LYMPHOCYTES # BLD AUTO: 7.6 % — LOW (ref 13–44)
MAGNESIUM SERPL-MCNC: 2.4 MG/DL — SIGNIFICANT CHANGE UP (ref 1.6–2.6)
MCHC RBC-ENTMCNC: 27.7 PG — SIGNIFICANT CHANGE UP (ref 27–34)
MCHC RBC-ENTMCNC: 32.7 GM/DL — SIGNIFICANT CHANGE UP (ref 32–36)
MCV RBC AUTO: 84.7 FL — SIGNIFICANT CHANGE UP (ref 80–100)
MONOCYTES # BLD AUTO: 2.15 K/UL — HIGH (ref 0–0.9)
MONOCYTES NFR BLD AUTO: 6.8 % — SIGNIFICANT CHANGE UP (ref 2–14)
NEUTROPHILS # BLD AUTO: 26.47 K/UL — HIGH (ref 1.8–7.4)
NEUTROPHILS NFR BLD AUTO: 84.3 % — HIGH (ref 43–77)
NITRITE UR-MCNC: NEGATIVE — SIGNIFICANT CHANGE UP
OSMOLALITY UR: 432 MOSM/KG — SIGNIFICANT CHANGE UP (ref 300–1000)
PH UR: 5 — SIGNIFICANT CHANGE UP (ref 5–8)
PHOSPHATE SERPL-MCNC: 3.6 MG/DL — SIGNIFICANT CHANGE UP (ref 2.4–4.7)
PLATELET # BLD AUTO: 426 K/UL — HIGH (ref 150–400)
POTASSIUM SERPL-MCNC: 4.9 MMOL/L — SIGNIFICANT CHANGE UP (ref 3.5–5.3)
POTASSIUM SERPL-MCNC: 5.3 MMOL/L — SIGNIFICANT CHANGE UP (ref 3.5–5.3)
POTASSIUM SERPL-SCNC: 4.9 MMOL/L — SIGNIFICANT CHANGE UP (ref 3.5–5.3)
POTASSIUM SERPL-SCNC: 5.3 MMOL/L — SIGNIFICANT CHANGE UP (ref 3.5–5.3)
POTASSIUM UR-SCNC: 35 MMOL/L — SIGNIFICANT CHANGE UP
PROT ?TM UR-MCNC: 50 MG/DL — HIGH (ref 0–12)
PROT SERPL-MCNC: 6.1 G/DL — LOW (ref 6.6–8.7)
PROT UR-MCNC: 30 MG/DL
PROT/CREAT UR-RTO: 0.7 RATIO — HIGH
RBC # BLD: 4.04 M/UL — SIGNIFICANT CHANGE UP (ref 3.8–5.2)
RBC # FLD: 15.4 % — HIGH (ref 10.3–14.5)
RBC CASTS # UR COMP ASSIST: >50 /HPF (ref 0–4)
SODIUM SERPL-SCNC: 135 MMOL/L — SIGNIFICANT CHANGE UP (ref 135–145)
SODIUM SERPL-SCNC: 136 MMOL/L — SIGNIFICANT CHANGE UP (ref 135–145)
SODIUM UR-SCNC: 31 MMOL/L — SIGNIFICANT CHANGE UP
SP GR SPEC: 1.01 — SIGNIFICANT CHANGE UP (ref 1.01–1.02)
SPECIMEN SOURCE: SIGNIFICANT CHANGE UP
UROBILINOGEN FLD QL: NEGATIVE MG/DL — SIGNIFICANT CHANGE UP
WBC # BLD: 31.41 K/UL — HIGH (ref 3.8–10.5)
WBC # FLD AUTO: 31.41 K/UL — HIGH (ref 3.8–10.5)
WBC UR QL: >50 /HPF (ref 0–5)

## 2023-06-05 PROCEDURE — 99221 1ST HOSP IP/OBS SF/LOW 40: CPT | Mod: GC

## 2023-06-05 PROCEDURE — 99233 SBSQ HOSP IP/OBS HIGH 50: CPT

## 2023-06-05 PROCEDURE — 99497 ADVNCD CARE PLAN 30 MIN: CPT | Mod: 25

## 2023-06-05 PROCEDURE — 99223 1ST HOSP IP/OBS HIGH 75: CPT

## 2023-06-05 RX ORDER — SODIUM CHLORIDE 9 MG/ML
1000 INJECTION, SOLUTION INTRAVENOUS
Refills: 0 | Status: DISCONTINUED | OUTPATIENT
Start: 2023-06-05 | End: 2023-06-06

## 2023-06-05 RX ADMIN — SODIUM CHLORIDE 75 MILLILITER(S): 9 INJECTION INTRAMUSCULAR; INTRAVENOUS; SUBCUTANEOUS at 11:56

## 2023-06-05 RX ADMIN — PIPERACILLIN AND TAZOBACTAM 25 GRAM(S): 4; .5 INJECTION, POWDER, LYOPHILIZED, FOR SOLUTION INTRAVENOUS at 05:53

## 2023-06-05 RX ADMIN — HEPARIN SODIUM 5000 UNIT(S): 5000 INJECTION INTRAVENOUS; SUBCUTANEOUS at 05:53

## 2023-06-05 RX ADMIN — AMLODIPINE BESYLATE 5 MILLIGRAM(S): 2.5 TABLET ORAL at 05:53

## 2023-06-05 NOTE — CONSULT NOTE ADULT - TIME BILLING
D/W daughter Charla and son Rhett, RN, hospitalist Dr Tristan, Hospice MD Dr Hill, [facility] SATINDER HigginsMorris      Total time also includes discussion during interdisciplinary team rounds, chart review, review of medications/ labs/ imaging, examination, care coordination with other health care professionals, documentation.

## 2023-06-05 NOTE — CONSULT NOTE ADULT - ATTENDING COMMENTS
History, exam, labs and imaging reviewed. Patient was evaluated this morning in the ED. In brief, she was brought in with AMS and diagnosed with a UTI. She was noted to have dilated colon with concern for a distal colon obstruction. She has a history of prior colon resection for colon cancer in the 1980s and had a temporary colostomy which was subsequently reversed. She has been having diarrhea per her son for several months but unable to elicit any other specific symptoms. She is in no distress, confused and abdomen is soft, non distended, no rebound or guarding. DARA deferred. I discussed with daughter and son bedside about radiologic findings of a possible distal colonic obstruction. Based on my review, there is a very low colorectal anastomosis. No clear mass but this is not always apparent radiologically, especially with a non contrast CT scan. Further work up with DARA,  anoscopy/rigid or flexible scope and even repeat imaging with contrast (rectal/oral) can be done to further evaluate this. However, the finding of a mass would not result in intervention for the mass after discussion with daughter and son. It is possible that this is just a colonic ileus as she has several risk factors for this (age, infection, limited mobility). I had a kris discussion if there is truly a mass causing obstruction, she may deteriorate and colon dilation may worsen even to the point of perforation. Again, there is no confirmation that there is clearly an obstruction but as outlined above, further workup would not likely . Recommend goals of care discussion. Discussed with Dr. Tristan.

## 2023-06-05 NOTE — PATIENT PROFILE ADULT - FALL HARM RISK - HARM RISK INTERVENTIONS

## 2023-06-05 NOTE — CONSULT NOTE ADULT - SUBJECTIVE AND OBJECTIVE BOX
COLORECTAL SURGERY CONSULT    HPI:    89F with PMHX CVA, CAD, HTN, Chronic Pain, Colon CA presents to [facility] ER with FTT for past 2 weeks. Patient with hx Dysphagia but now no longer tolerating water intake per son (primary caregiver) at bedside. Patient with AMS unable to provdie HPI/ROS. HCP Daughter Charla Stanley providing HPI/ROS. Patient noted to have significant leukocytosis, ARF, AGMA, and Hyperkalemia on labwork. UA positive for UTI. Cultures pending. Started on empiric IV ABX And IVF. GOC discussed with family at bedside. MOLST re-done. Confirmed patient is DNR/DNI but ok for other interventions. Would like assistance of palliative care going forward to discuss options pending current clinical status.     Surgery was contacted due to a possible LBO on a non-contrast CT scan of the abdomen.  Unsure if the patient is vomiting or having bowel function.      ROS limited 2/2 AMS.    PMHX: CVA, CAD, HTN, Chronic Pain, Colon CA  PSHX: Unknown  FamHx: Denies fam hx HTN  Social Hx: Denies etoh/tobacco/drug abuse (2023 21:06)      PAST MEDICAL HISTORY:  Angina at rest    Stroke    Malignant neoplasm of colon, unspecified part of colon    HTN (hypertension)        PAST SURGICAL HISTORY:  S/P splenectomy    Status post cataract extraction and insertion of intraocular lens of left eye    S/P cataract surgery, right        ALLERGIES:  No Known Allergies      FAMILY HISTORY: Noncontributory    SOCIAL HISTORY: Denies tobacco, EtOH, illicit substance use.     HOME MEDICATIONS:    MEDICATIONS  (STANDING):  amLODIPine   Tablet 5 milliGRAM(s) Oral daily  aspirin  chewable 81 milliGRAM(s) Oral daily  heparin   Injectable 5000 Unit(s) SubCutaneous every 12 hours  isosorbide   mononitrate ER Tablet (IMDUR) 60 milliGRAM(s) Oral daily  labetalol 150 milliGRAM(s) Oral every 8 hours  piperacillin/tazobactam IVPB.. 3.375 Gram(s) IV Intermittent every 12 hours  sodium chloride 0.9%. 1000 milliLiter(s) (75 mL/Hr) IV Continuous <Continuous>    MEDICATIONS  (PRN):  acetaminophen     Tablet .. 650 milliGRAM(s) Oral every 6 hours PRN Temp greater or equal to 38C (100.4F), Mild Pain (1 - 3)  aluminum hydroxide/magnesium hydroxide/simethicone Suspension 30 milliLiter(s) Oral every 4 hours PRN Dyspepsia  melatonin 3 milliGRAM(s) Oral at bedtime PRN Insomnia  ondansetron Injectable 4 milliGRAM(s) IV Push every 8 hours PRN Nausea and/or Vomiting  traMADol 50 milliGRAM(s) Oral daily PRN Moderate Pain (4 - 6)      VITALS & I/Os:  Vital Signs Last 24 Hrs  T(C): 36.2 (2023 22:33), Max: 36.4 (2023 17:19)  T(F): 97.1 (2023 22:33), Max: 97.6 (2023 19:08)  HR: 87 (2023 04:52) (78 - 87)  BP: 115/70 (2023 04:52) (115/70 - 151/80)  BP(mean): 85 (2023 04:52) (84 - 85)  RR: 17 (2023 04:52) (12 - 19)  SpO2: 98% (2023 04:52) (93% - 99%)    Parameters below as of 2023 23:42  Patient On (Oxygen Delivery Method): room air      CAPILLARY BLOOD GLUCOSE      POCT Blood Glucose.: 117 mg/dL (2023 17:17)      I&O's Summary    GENERAL: Confused, no obvious distress   RESPIRATORY: CTAB. No wheezing, rales or rhonchi.  CARDIOVASCULAR: RRR. No audible murmurs, rubs or gallops.   GASTROINTESTINAL: Abdomen soft, midline laparotomy scar, non-distended, non-tender, no evidence of peritonitis     LABS:                        11.2   31.41 )-----------( 426      ( 2023 02:50 )             34.2     06-05    135  |  104  |  157.1<H>  ----------------------------<  126<H>  5.3   |  11.0<L>  |  3.16<H>    Ca    9.6      2023 02:50  Phos  3.6     06-05  Mg     2.4     06-05    TPro  6.1<L>  /  Alb  3.0<L>  /  TBili  0.4  /  DBili  x   /  AST  28  /  ALT  18  /  AlkPhos  102  06-05    Lactate: acetaminophen     Tablet .. 650 milliGRAM(s) Oral every 6 hours PRN  aluminum hydroxide/magnesium hydroxide/simethicone Suspension 30 milliLiter(s) Oral every 4 hours PRN  amLODIPine   Tablet 5 milliGRAM(s) Oral daily  aspirin  chewable 81 milliGRAM(s) Oral daily  heparin   Injectable 5000 Unit(s) SubCutaneous every 12 hours  isosorbide   mononitrate ER Tablet (IMDUR) 60 milliGRAM(s) Oral daily  labetalol 150 milliGRAM(s) Oral every 8 hours  melatonin 3 milliGRAM(s) Oral at bedtime PRN  ondansetron Injectable 4 milliGRAM(s) IV Push every 8 hours PRN  piperacillin/tazobactam IVPB.. 3.375 Gram(s) IV Intermittent every 12 hours  sodium chloride 0.9%. 1000 milliLiter(s) IV Continuous <Continuous>  traMADol 50 milliGRAM(s) Oral daily PRN     PT/INR - ( 2023 18:05 )   PT: 11.9 sec;   INR: 1.03 ratio         PTT - ( 2023 18:05 )  PTT:27.4 sec          Urinalysis Basic - ( 2023 17:58 )    Color: Yellow / Appearance: very cloudy / S.015 / pH: x  Gluc: x / Ketone: Trace  / Bili: Moderate / Urobili: Negative   Blood: x / Protein: 30 mg/dL / Nitrite: Positive   Leuk Esterase: Moderate / RBC: >50 /HPF / WBC >50 /HPF   Sq Epi: x / Non Sq Epi: x / Bacteria: Moderate        IMAGING:    FINDINGS:  LOWER CHEST: Chronic bronchiectasis and interstitial opacities lung bases. Cardiomegaly.    LIVER: Within normal limits.  BILE DUCTS: Chronic compensatory dilation CBD.  GALLBLADDER: Cholecystectomy.  SPLEEN: Splenectomy. Small splenule LUQ.  PANCREAS: Within normal limits.  ADRENALS: Within normal limits.  KIDNEYS/URETERS: No hydronephrosis or stone or concerning mass. 5.5 cm simple cyst medial aspect lower pole left kidney.    BLADDER: Within normal limits.  REPRODUCTIVE ORGANS: Uterus and ovaries not identified separate from fluid-filled loops of bowel in the pelvis. Most likely either status post hysterectomy or small in size GYN structures.    BOWEL: Status post partial colectomy. Anastomosis deep in the pelvis. Progressive dilation of distal bowel loops with air-fluid levels. No inflammation of the bowel. Appendix not identified probably resected.  PERITONEUM: No ascites.  VESSELS: No abdominal aortic aneurysm. Severe calcific atherosclerosis.  RETROPERITONEUM/LYMPH NODES: No lymphadenopathy.  ABDOMINAL WALL: Within normal limits.  BONES: No acute findings. Bony demineralization. Previous ORIF left femoral fracture. Mild left scoliosis and degenerative changes lumbar spine.    IMPRESSION:  Suspect distal colonic obstruction due to either adhesion or anastomotic stricture in the pelvis. Ileus less likely. COLORECTAL SURGERY CONSULT    HPI:    89F with PMHX CVA, CAD, HTN, Chronic Pain, Colon CA presents to [facility] ER with FTT for past 2 weeks. Patient with hx Dysphagia but now no longer tolerating water intake per son (primary caregiver) at bedside. Patient with AMS unable to provdie HPI/ROS. HCP Daughter Charla Stanley providing HPI/ROS. Patient noted to have significant leukocytosis, ARF, AGMA, and Hyperkalemia on labwork. UA positive for UTI. Cultures pending. Started on empiric IV ABX And IVF. GOC discussed with family at bedside. MOLST re-done. Confirmed patient is DNR/DNI but ok for other interventions. Would like assistance of palliative care going forward to discuss options pending current clinical status.     Surgery was contacted due to a possible LBO on a non-contrast CT scan of the abdomen.  Unsure if the patient is vomiting or having bowel function.      ROS limited 2/2 AMS.    PMHX: CVA, CAD, HTN, Chronic Pain, Colon CA  PSHX: Unknown  FamHx: Denies fam hx HTN  Social Hx: Denies etoh/tobacco/drug abuse (2023 21:06)      PAST MEDICAL HISTORY:  Angina at rest    Stroke    Malignant neoplasm of colon, unspecified part of colon    HTN (hypertension)        PAST SURGICAL HISTORY:  S/P splenectomy    Status post cataract extraction and insertion of intraocular lens of left eye    S/P cataract surgery, right        ALLERGIES:  No Known Allergies      FAMILY HISTORY: Noncontributory    SOCIAL HISTORY: Denies tobacco, EtOH, illicit substance use.     HOME MEDICATIONS:    MEDICATIONS  (STANDING):  amLODIPine   Tablet 5 milliGRAM(s) Oral daily  aspirin  chewable 81 milliGRAM(s) Oral daily  heparin   Injectable 5000 Unit(s) SubCutaneous every 12 hours  isosorbide   mononitrate ER Tablet (IMDUR) 60 milliGRAM(s) Oral daily  labetalol 150 milliGRAM(s) Oral every 8 hours  piperacillin/tazobactam IVPB.. 3.375 Gram(s) IV Intermittent every 12 hours  sodium chloride 0.9%. 1000 milliLiter(s) (75 mL/Hr) IV Continuous <Continuous>    MEDICATIONS  (PRN):  acetaminophen     Tablet .. 650 milliGRAM(s) Oral every 6 hours PRN Temp greater or equal to 38C (100.4F), Mild Pain (1 - 3)  aluminum hydroxide/magnesium hydroxide/simethicone Suspension 30 milliLiter(s) Oral every 4 hours PRN Dyspepsia  melatonin 3 milliGRAM(s) Oral at bedtime PRN Insomnia  ondansetron Injectable 4 milliGRAM(s) IV Push every 8 hours PRN Nausea and/or Vomiting  traMADol 50 milliGRAM(s) Oral daily PRN Moderate Pain (4 - 6)      VITALS & I/Os:  Vital Signs Last 24 Hrs  T(C): 36.2 (2023 22:33), Max: 36.4 (2023 17:19)  T(F): 97.1 (2023 22:33), Max: 97.6 (2023 19:08)  HR: 87 (2023 04:52) (78 - 87)  BP: 115/70 (2023 04:52) (115/70 - 151/80)  BP(mean): 85 (2023 04:52) (84 - 85)  RR: 17 (2023 04:52) (12 - 19)  SpO2: 98% (2023 04:52) (93% - 99%)    Parameters below as of 2023 23:42  Patient On (Oxygen Delivery Method): room air      CAPILLARY BLOOD GLUCOSE      POCT Blood Glucose.: 117 mg/dL (2023 17:17)      I&O's Summary    GENERAL: Confused, no obvious distress   RESPIRATORY: CTAB. No wheezing, rales or rhonchi.  CARDIOVASCULAR: RRR. No audible murmurs, rubs or gallops.   GASTROINTESTINAL: Abdomen soft, midline laparotomy scar, LUQ colostomy scar, non-distended, non-tender, no evidence of peritonitis     LABS:                        11.2   31.41 )-----------( 426      ( 2023 02:50 )             34.2     06-05    135  |  104  |  157.1<H>  ----------------------------<  126<H>  5.3   |  11.0<L>  |  3.16<H>    Ca    9.6      2023 02:50  Phos  3.6     06-05  Mg     2.4     06-05    TPro  6.1<L>  /  Alb  3.0<L>  /  TBili  0.4  /  DBili  x   /  AST  28  /  ALT  18  /  AlkPhos  102  06-05    Lactate: acetaminophen     Tablet .. 650 milliGRAM(s) Oral every 6 hours PRN  aluminum hydroxide/magnesium hydroxide/simethicone Suspension 30 milliLiter(s) Oral every 4 hours PRN  amLODIPine   Tablet 5 milliGRAM(s) Oral daily  aspirin  chewable 81 milliGRAM(s) Oral daily  heparin   Injectable 5000 Unit(s) SubCutaneous every 12 hours  isosorbide   mononitrate ER Tablet (IMDUR) 60 milliGRAM(s) Oral daily  labetalol 150 milliGRAM(s) Oral every 8 hours  melatonin 3 milliGRAM(s) Oral at bedtime PRN  ondansetron Injectable 4 milliGRAM(s) IV Push every 8 hours PRN  piperacillin/tazobactam IVPB.. 3.375 Gram(s) IV Intermittent every 12 hours  sodium chloride 0.9%. 1000 milliLiter(s) IV Continuous <Continuous>  traMADol 50 milliGRAM(s) Oral daily PRN     PT/INR - ( 2023 18:05 )   PT: 11.9 sec;   INR: 1.03 ratio         PTT - ( 2023 18:05 )  PTT:27.4 sec          Urinalysis Basic - ( 2023 17:58 )    Color: Yellow / Appearance: very cloudy / S.015 / pH: x  Gluc: x / Ketone: Trace  / Bili: Moderate / Urobili: Negative   Blood: x / Protein: 30 mg/dL / Nitrite: Positive   Leuk Esterase: Moderate / RBC: >50 /HPF / WBC >50 /HPF   Sq Epi: x / Non Sq Epi: x / Bacteria: Moderate        IMAGING:    FINDINGS:  LOWER CHEST: Chronic bronchiectasis and interstitial opacities lung bases. Cardiomegaly.    LIVER: Within normal limits.  BILE DUCTS: Chronic compensatory dilation CBD.  GALLBLADDER: Cholecystectomy.  SPLEEN: Splenectomy. Small splenule LUQ.  PANCREAS: Within normal limits.  ADRENALS: Within normal limits.  KIDNEYS/URETERS: No hydronephrosis or stone or concerning mass. 5.5 cm simple cyst medial aspect lower pole left kidney.    BLADDER: Within normal limits.  REPRODUCTIVE ORGANS: Uterus and ovaries not identified separate from fluid-filled loops of bowel in the pelvis. Most likely either status post hysterectomy or small in size GYN structures.    BOWEL: Status post partial colectomy. Anastomosis deep in the pelvis. Progressive dilation of distal bowel loops with air-fluid levels. No inflammation of the bowel. Appendix not identified probably resected.  PERITONEUM: No ascites.  VESSELS: No abdominal aortic aneurysm. Severe calcific atherosclerosis.  RETROPERITONEUM/LYMPH NODES: No lymphadenopathy.  ABDOMINAL WALL: Within normal limits.  BONES: No acute findings. Bony demineralization. Previous ORIF left femoral fracture. Mild left scoliosis and degenerative changes lumbar spine.    IMPRESSION:  Suspect distal colonic obstruction due to either adhesion or anastomotic stricture in the pelvis. Ileus less likely.

## 2023-06-05 NOTE — PATIENT PROFILE ADULT - FUNCTIONAL ASSESSMENT - BASIC MOBILITY ASSESSMENT TYPE
Detail Level: Generalized Include Location In Plan?: No Include Location In Plan?: Yes Detail Level: Simple Admission

## 2023-06-05 NOTE — CONSULT NOTE ADULT - CONVERSATION DETAILS
Met with son and joined shortly by daughter Charla, introduced role and scope of palliative care team. Both confirmed that Charla was HCP (she has form). Pt with confused and with poor insight for appropriate medical decision making capacity. Family endorsed that patient prior to 1 month ago was more ambulatory with her walker but for the past several weeks has had a progressive clinical decline. Currently full assist with ADLs and mostly bedbound. Prior to this she had intermittent confusion especially with short term memory; likely underlying dementia but was never formally diagnosed. Son is primary caregiver and lives at home with mother.     Daughter was able to reiterate prior discussions with other providers including hospitalist today. She reaffirmed decision for DNR/DNI MOLST and interest in exploring hospice services with a preference for inpatient facility. She states talking to a close family member who shared good [facility]" and is hopeful to get mother there. Explained to daughter that protocol and evaluation process by hospice team and various settings including inpatient setting (requires active symptoms warranting frequent intravenous route of medication to maintain seamless symptom control) vs home vs LTC facility. She states uncertainty that brother can provide the level of care needed in the home setting. Writer than broached NH facility with comfort measures and she immediately stated "absolutely not" and that "I promised I would never do that to her." Daughter adamant about getting mother into "[facility]ospice [facility]." Charla is also awaiting her son coming from [employer] before making any final decision on next steps.     Family at this time would like to continue with conservative medical management with hopes mother will continue to  make some clinically improvement.

## 2023-06-05 NOTE — CONSULT NOTE ADULT - ASSESSMENT
89F with PMHX CVA, CAD, HTN, Chronic Pain, Colon CA presents to [facility] ER with FTT for past 2 weeks.  Patient noted to have significant leukocytosis, ARF, AGMA, and Hyperkalemia on labwork. UA positive for UTI. Cultures pending. Started on empiric IV ABX And IVF.  Surgery was contacted due to a possible LBO on a non-contrast CT scan of the abdomen.  Unsure if the patient is vomiting or having bowel function.      Recommendations:   NPO, IVF  Monitor for ROBF   No acute surgical intervention at this time   Colorectal surgery will follow along    89F with PMHX CVA, CAD, HTN, Chronic Pain, Colon CA presents to [facility] ER with FTT for past 2 weeks.  Patient noted to have significant leukocytosis, ARF, AGMA, and Hyperkalemia on labwork. UA positive for UTI. Cultures pending. Started on empiric IV ABX And IVF.  Surgery was contacted due to a possible LBO on a non-contrast CT scan of the abdomen.  Unsure if the patient is vomiting or having bowel function.  After speaking with her son, she has been having loose stools for sometime.      Recommendations:   Rounds with Dr. Cho     - Discussed with family, she has a history of left colonic resection in the 80s, history of colostomy which was reversed.  She has been having diarrhea at home, denies nausea or vomiting according to son.  They advised that they would not want surgical intervention if that was deemed necessary.    - There will be no surgical intervention at this time.  - Please feel free to contact the colorectal surgery team with any questions.

## 2023-06-05 NOTE — CONSULT NOTE ADULT - ASSESSMENT
89F with hx of prior CVAs/TIAs, CAD, HTN, remote colon cancer s/p partial colectomy, chronic dysphagia, ?underlying dementia presents from home with failure to thrive and AMS, found with leukocytosis, NENA with uremia, UTI and presumed LBO on CT. GOC initiated by primary team, family elected for DNR/DNI but continue with conservative medical mgnt and interest in hospice eval. Palliative consulted for support and ongoing goc/hospice.     Acute Encephalopathy  Likely Underlying Dementia, Vascular in Nature  - CTH with chronic microvascular ischemic changes  - etiology multifactorial: infection, uremia, electrolyte abnormalities, FTT, suspect underlying dementia  - son reports at baseline ~1 month ago, had notable short term memory deficits, can ambulate with walker and requires assist with some ADLs. Currently in the past 1 month, progressive clinical decline now less conversant, significant assist with ADLs and mostly sedentary  - severe to advance stage --> at present appears FAST7C+ and hospice appropriate  - supportive care, reorientation, sleep hygiene     UTI  - UA grossly positive, follow up urine cx  - c/w antibiotics  - trend wbc and fever curve    NENA + Uremia  - likely secondary to poor oral intake  - c/w IVF, serial renal indices and avoid nephrotoxic agents    Acute/Chronic Dysphagia  Anorexia/Cachexia Syndrome  - recent MBS 5/22 rec easy to chew with mildly thicken liquid by the 1/2 teaspoonful but notes that pt is unlikely to maintain adequate nutrition/hydration   - currently NPO, aspiration precaution    Palliative Care Encounter  Goals of Care  - see above  - DNR/DNI MOLST in place  - GOC: continue with conservative medical mgnt and family amendable to hospice eval

## 2023-06-05 NOTE — CONSULT NOTE ADULT - SUBJECTIVE AND OBJECTIVE BOX
[facility] PALLIATIVE MEDICINE CONSULT    CC: Patient is a 89y old  Female who presents with a chief complaint of AMS, UTI, Hyperkalemia, ARF, AGMA, FTT (2023 07:37)    HPI:  Mrs. Koenig is a 89 year old female with hx of CVA, CAD, HTN, chronic pain, remote colon cancer s/p partial colectomy, chronic dysphagia presented from home with failure to thrive x 2-3 weeks. Son reports progressive decline for the past 3 weeks since MBS study, now unable to tolerate any water intake. Son lives with mother and is primary caregiver. HCP is daughter Charla Stanley. Initial labs significant for leukocytosis NENA with uremia (BUN/Cr 159/3.35) and UA with pyruia. CTH without acute pathology. CT A/P with supsected distal colonic obstruction 2/2 adhesions vs strictures. Admitted for workup of AMS, FTT, NENA, UTI, suspected LBO. GOC initiated by both ED team and admitting hospitalist, family elected to implement DNR/DNI with MOLST completed (in patient window) and continue with conservative medical management and interest in hospice evaluation. Palliative consulted for support and assist with ongoing goals of care and hospice.     Pt seen at bedside in the ER. Son was present who then called his sister Charla (HCP) who came to bedside shortly later. Pt was calm, pleasantly confused, mumbling with difficulty comprehending speech.       Present Symptoms:     Dyspnea: no  Nausea/Vomiting:  No  Anxiety:   No  Depression: unable to obtain  Fatigue:  No  Loss of appetite: NPO  Constipation:  None documented     Pain: No overt clinical sign of distress            Character-            Duration-            Effect-            Factors-            Frequency-            Location-            Severity-    Pain AD Score:  [email or web address] (press ctrl + left click to view)    Review of Systems: Unable to obtain due to poor mentation. Family has noted short term memory deficits but no formal diagnosis of dementia.     PERTINENT PMH REVIEWED: Yes      PAST MEDICAL & SURGICAL HISTORY:  Prior TIA/CVAs  Remote hx of Colon Cancer S/P partial colectomy   HTN (hypertension)  Dementia    S/P splenectomy  S/P cataract surgery, right + left      FAMILY HISTORY:  No pertinent family history in first degree relatives        Allergies    No Known Allergies    Intolerances    SOCIAL HISTORY:                      Substance history:                    Admitted from:  home, lives with son                     Children: 2                    Lutheran/spirituality:                    Cultural concerns:       DECISION MAKER(s):[] Health Care Proxy(s)  [] Surrogate(s)  [] Guardian             - HCP is corky Dunham    ADVANCE DIRECTIVES/TREATMENT PREFERENCES: DNR/DNI MOLST by admitting hospitalist (scanned in patient window)    Karnofsky/Palliative Performance Status Version 2: 20 %  [email or web address]    Baseline ADLs (prior to admission): mostly dependent      MEDICATIONS  (STANDING):  amLODIPine   Tablet 5 milliGRAM(s) Oral daily  aspirin  chewable 81 milliGRAM(s) Oral daily  heparin   Injectable 5000 Unit(s) SubCutaneous every 12 hours  isosorbide   mononitrate ER Tablet (IMDUR) 60 milliGRAM(s) Oral daily  labetalol 150 milliGRAM(s) Oral every 8 hours  lactated ringers. 1000 milliLiter(s) (80 mL/Hr) IV Continuous <Continuous>  piperacillin/tazobactam IVPB.. 3.375 Gram(s) IV Intermittent every 12 hours    MEDICATIONS  (PRN):  acetaminophen     Tablet .. 650 milliGRAM(s) Oral every 6 hours PRN Temp greater or equal to 38C (100.4F), Mild Pain (1 - 3)  aluminum hydroxide/magnesium hydroxide/simethicone Suspension 30 milliLiter(s) Oral every 4 hours PRN Dyspepsia  melatonin 3 milliGRAM(s) Oral at bedtime PRN Insomnia  ondansetron Injectable 4 milliGRAM(s) IV Push every 8 hours PRN Nausea and/or Vomiting  traMADol 50 milliGRAM(s) Oral daily PRN Moderate Pain (4 - 6)      PHYSICAL EXAM:    Vital Signs Last 24 Hrs  T(C): 37.1 (2023 11:19), Max: 37.1 (2023 11:19)  T(F): 98.7 (2023 11:19), Max: 98.7 (2023 11:19)  HR: 79 (2023 11:19) (78 - 87)  BP: 153/63 (2023 11:19) (115/70 - 153/63)  BP(mean): 85 (2023 04:52) (84 - 85)  RR: 18 (2023 11:19) (12 - 19)  SpO2: 98% (2023 11:19) (93% - 99%)    Parameters below as of 2023 11:19  Patient On (Oxygen Delivery Method): room air    General: frail. cachectic. resting comfortably and no acute distress.     HEENT: mucous membrane moist      Lungs: comfortable nonlabored      CV: S1/S2. Regular rate and rhythm    GI: +BS abdomen soft, nondistended, nontender     : primafit    MSK:  no cyanosis or edema +weakness    Neuro: nonfocal. awake but confused, minimally conversant    Skin: warm and dry.     LABS:                        11.2   31.41 )-----------( 426      ( 2023 02:50 )             34.2     06-05    135  |  104  |  157.1<H>  ----------------------------<  126<H>  5.3   |  11.0<L>  |  3.16<H>    Ca    9.6      2023 02:50  Phos  3.6     06-05  Mg     2.4     06-05    TPro  6.1<L>  /  Alb  3.0<L>  /  TBili  0.4  /  DBili  x   /  AST  28  /  ALT  18  /  AlkPhos  102  06-05    PT/INR - ( 2023 18:05 )   PT: 11.9 sec;   INR: 1.03 ratio         PTT - ( 2023 18:05 )  PTT:27.4 sec  Urinalysis Basic - ( 2023 17:58 )    Color: Yellow / Appearance: very cloudy / S.015 / pH: x  Gluc: x / Ketone: Trace  / Bili: Moderate / Urobili: Negative   Blood: x / Protein: 30 mg/dL / Nitrite: Positive   Leuk Esterase: Moderate / RBC: >50 /HPF / WBC >50 /HPF   Sq Epi: x / Non Sq Epi: x / Bacteria: Moderate      I&O's Summary      RADIOLOGY & ADDITIONAL STUDIES:        [facility]     ACC: [id number] EXAM:  CT BRAIN   ORDERED BY: FADI KUHN     PROCEDURE DATE:  2023      INTERPRETATION:  CLINICAL INDICATION: Altered mental status    TECHNIQUE: Axial CT scanning of the brain was obtained from the skull   base to the vertex without the administration of intravenous contrast.   Reformatted coronal and sagittal images were subsequently obtained and   reviewed.    COMPARISON: 2021    FINDINGS:  There is no CT evidence of acute transcortical infarct. Age-related   involutional changes and chronic microvascular ischemic changes.    There is no hydrocephalus, mass effect, or acute intracranial hemorrhage.   No extra-axial collection. Basal cisterns are patent.    The visualized paranasal sinuses and mastoid air cells are clear.    The calvarium is intact.    Bilateral cataract surgery.    IMPRESSION:  No evidence of acute transcortical infarct, acute intracranial   hemorrhage, or mass effect.    --- End of Report ---  IVELISSE DUQUE MD; Attending Radiologist  This document has been electronically signed. 2023  5:46PM      CT A/P    ACC: [id number] EXAM:  CT ABDOMEN AND PELVIS   ORDERED BY: FADI KUHN     PROCEDURE DATE:  2023      INTERPRETATION:  CLINICAL INFORMATION: Abdominal pain. History of colon   cancer and hypertension.    COMPARISON: CT chest 12/3/2021.    CONTRAST/COMPLICATIONS:  IV Contrast: NONE  Oral Contrast: NONE  Complications: None reported at time of study completion    PROCEDURE:  CT of the Abdomen and Pelvis was performed.  Sagittal and coronal reformats were performed.    FINDINGS:  LOWER CHEST: Chronic bronchiectasis and interstitial opacities lung   bases. Cardiomegaly.    LIVER: Within normal limits.  BILE DUCTS: Chronic compensatory dilation CBD.  GALLBLADDER: Cholecystectomy.  SPLEEN: Splenectomy. Small splenule LUQ.  PANCREAS: Within normal limits.  ADRENALS: Within normal limits.  KIDNEYS/URETERS: No hydronephrosis or stone or concerning mass. 5.5 cm   simple cyst medial aspect lower pole left kidney.    BLADDER: Within normal limits.  REPRODUCTIVE ORGANS: Uterus and ovaries not identified separate from   fluid-filled loops of bowel in the pelvis. Most likely either status post   hysterectomy or small in size GYN structures.    BOWEL: Status post partial colectomy. Anastomosis deep in the pelvis.   Progressive dilation of distal bowel loops with air-fluid levels. No   inflammation of the bowel. Appendix not identified probably resected.  PERITONEUM: No ascites.  VESSELS: No abdominal aortic aneurysm. Severe calcific atherosclerosis.  RETROPERITONEUM/LYMPH NODES: No lymphadenopathy.  ABDOMINAL WALL: Within normal limits.  BONES: No acute findings. Bony demineralization. Previous ORIF left   femoral fracture. Mild left scoliosis and degenerative changes lumbar   spine.    IMPRESSION:  Suspect distal colonic obstruction due to either adhesion or anastomotic   stricture in the pelvis. Ileus less likely.    No other acute findings.    --- End of Report ---    NAVEED CASTRO MD; Attending Radiologist  This document has been electronically signed. 2023  9:19PM    NEUROLOGICAL MEDICATIONS/OPIOIDS/BENZODIAZEPINE IN PAST 24 HOURS: none

## 2023-06-06 LAB
-  COAGULASE NEGATIVE STAPHYLOCOCCUS: SIGNIFICANT CHANGE UP
CULTURE RESULTS: SIGNIFICANT CHANGE UP
CULTURE RESULTS: SIGNIFICANT CHANGE UP
METHOD TYPE: SIGNIFICANT CHANGE UP
ORGANISM # SPEC MICROSCOPIC CNT: SIGNIFICANT CHANGE UP
ORGANISM # SPEC MICROSCOPIC CNT: SIGNIFICANT CHANGE UP
SPECIMEN SOURCE: SIGNIFICANT CHANGE UP
SPECIMEN SOURCE: SIGNIFICANT CHANGE UP
UUN UR-MCNC: 756 MG/DL — SIGNIFICANT CHANGE UP

## 2023-06-06 PROCEDURE — 99233 SBSQ HOSP IP/OBS HIGH 50: CPT

## 2023-06-06 RX ORDER — VANCOMYCIN HCL 1 G
1000 VIAL (EA) INTRAVENOUS EVERY 24 HOURS
Refills: 0 | Status: DISCONTINUED | OUTPATIENT
Start: 2023-06-06 | End: 2023-06-06

## 2023-06-06 RX ORDER — LABETALOL HCL 100 MG
5 TABLET ORAL EVERY 4 HOURS
Refills: 0 | Status: DISCONTINUED | OUTPATIENT
Start: 2023-06-06 | End: 2023-06-07

## 2023-06-06 RX ORDER — MORPHINE SULFATE 50 MG/1
0.5 CAPSULE, EXTENDED RELEASE ORAL EVERY 4 HOURS
Refills: 0 | Status: DISCONTINUED | OUTPATIENT
Start: 2023-06-06 | End: 2023-06-07

## 2023-06-06 RX ORDER — ACETAMINOPHEN 500 MG
325 TABLET ORAL EVERY 6 HOURS
Refills: 0 | Status: DISCONTINUED | OUTPATIENT
Start: 2023-06-06 | End: 2023-06-08

## 2023-06-06 RX ORDER — DIPHENHYDRAMINE HYDROCHLORIDE AND LIDOCAINE HYDROCHLORIDE AND ALUMINUM HYDROXIDE AND MAGNESIUM HYDRO
5 KIT
Refills: 0 | Status: DISCONTINUED | OUTPATIENT
Start: 2023-06-06 | End: 2023-06-08

## 2023-06-06 RX ORDER — SODIUM CHLORIDE 9 MG/ML
1000 INJECTION, SOLUTION INTRAVENOUS
Refills: 0 | Status: DISCONTINUED | OUTPATIENT
Start: 2023-06-06 | End: 2023-06-06

## 2023-06-06 RX ORDER — CHLORHEXIDINE GLUCONATE 213 G/1000ML
15 SOLUTION TOPICAL
Refills: 0 | Status: DISCONTINUED | OUTPATIENT
Start: 2023-06-06 | End: 2023-06-07

## 2023-06-06 RX ORDER — SALIVA SUBSTITUTE COMB NO.11 351 MG
15 POWDER IN PACKET (EA) MUCOUS MEMBRANE EVERY 4 HOURS
Refills: 0 | Status: DISCONTINUED | OUTPATIENT
Start: 2023-06-06 | End: 2023-06-08

## 2023-06-06 RX ORDER — VANCOMYCIN HCL 1 G
VIAL (EA) INTRAVENOUS
Refills: 0 | Status: DISCONTINUED | OUTPATIENT
Start: 2023-06-06 | End: 2023-06-06

## 2023-06-06 RX ORDER — SODIUM CHLORIDE 9 MG/ML
1000 INJECTION, SOLUTION INTRAVENOUS
Refills: 0 | Status: DISCONTINUED | OUTPATIENT
Start: 2023-06-06 | End: 2023-06-07

## 2023-06-06 RX ADMIN — DIPHENHYDRAMINE HYDROCHLORIDE AND LIDOCAINE HYDROCHLORIDE AND ALUMINUM HYDROXIDE AND MAGNESIUM HYDRO 5 MILLILITER(S): KIT at 18:17

## 2023-06-06 RX ADMIN — HEPARIN SODIUM 5000 UNIT(S): 5000 INJECTION INTRAVENOUS; SUBCUTANEOUS at 18:20

## 2023-06-06 RX ADMIN — SODIUM CHLORIDE 75 MILLILITER(S): 9 INJECTION, SOLUTION INTRAVENOUS at 16:18

## 2023-06-06 RX ADMIN — HEPARIN SODIUM 5000 UNIT(S): 5000 INJECTION INTRAVENOUS; SUBCUTANEOUS at 05:23

## 2023-06-06 RX ADMIN — PIPERACILLIN AND TAZOBACTAM 25 GRAM(S): 4; .5 INJECTION, POWDER, LYOPHILIZED, FOR SOLUTION INTRAVENOUS at 05:22

## 2023-06-06 RX ADMIN — SODIUM CHLORIDE 80 MILLILITER(S): 9 INJECTION, SOLUTION INTRAVENOUS at 05:21

## 2023-06-06 RX ADMIN — CHLORHEXIDINE GLUCONATE 15 MILLILITER(S): 213 SOLUTION TOPICAL at 18:20

## 2023-06-06 RX ADMIN — PIPERACILLIN AND TAZOBACTAM 25 GRAM(S): 4; .5 INJECTION, POWDER, LYOPHILIZED, FOR SOLUTION INTRAVENOUS at 18:21

## 2023-06-06 RX ADMIN — Medication 15 MILLILITER(S): at 18:17

## 2023-06-06 NOTE — SWALLOW BEDSIDE ASSESSMENT ADULT - SLP PERTINENT HISTORY OF CURRENT PROBLEM
As per MD note, "89F with hx of prior CVAs/TIAs, CAD, HTN, remote colon cancer s/p partial colectomy, chronic dysphagia, dementia presents from home with failure to thrive and AMS, found with leukocytosis, NENA with uremia, UTI and presumed LBO on CT. GOC initiated by primary team, family elected for DNR/DNI but continue with conservative medical mgnt and interest in hospice eval. Palliative was consulted for support and ongoing goc/hospice".

## 2023-06-06 NOTE — DIETITIAN INITIAL EVALUATION ADULT - PERTINENT LABORATORY DATA
06-05    135  |  104  |  157.1<H>  ----------------------------<  126<H>  5.3   |  11.0<L>  |  3.16<H>    Ca    9.6      05 Jun 2023 02:50  Phos  3.6     06-05  Mg     2.4     06-05    TPro  6.1<L>  /  Alb  3.0<L>  /  TBili  0.4  /  DBili  x   /  AST  28  /  ALT  18  /  AlkPhos  102  06-05

## 2023-06-06 NOTE — DIETITIAN INITIAL EVALUATION ADULT - OTHER INFO
89F with PMHX CVA, CAD, HTN, Chronic Pain, Colon CA presents to [facility] ER with FTT for past 2 weeks admitted for AMS 2/2 Acute Complicated UTI, ARF, Hyperkalemia, AGMA, and FTT.

## 2023-06-06 NOTE — DIETITIAN INITIAL EVALUATION ADULT - NSFNSPHYEXAMSKINFT_GEN_A_CORE
Pressure Injury 1: Right:, buttocks, Stage II  Pressure Injury 2: Bilateral:, heel, Suspected deep tissue injury  Pressure Injury 3: none, none  Pressure Injury 4: none, none  Pressure Injury 5: none, none  Pressure Injury 6: none, none  Pressure Injury 7: none, none  Pressure Injury 8: none, none  Pressure Injury 9: none, none  Pressure Injury 10: none, none  Pressure Injury 11: none, none, Pressure Injury 1: Right:, buttocks, Stage II  Pressure Injury 2: Bilateral:, heel, Suspected deep tissue injury  Pressure Injury 3: none, none  Pressure Injury 4: none, none  Pressure Injury 5: none, none  Pressure Injury 6: none, none  Pressure Injury 7: none, none  Pressure Injury 8: none, none  Pressure Injury 9: none, none  Pressure Injury 10: none, none  Pressure Injury 11: none, none

## 2023-06-06 NOTE — SWALLOW BEDSIDE ASSESSMENT ADULT - SWALLOW EVAL: DIAGNOSIS
Overall swallow profile poorly impacted upon by cognitive limitations w/apparent pain in oral cavity w/intake. Pt w/successful orientation to utensil and acceptance of PO via tsp, however once placed in oral cavity, Pt with immediate screaming/yelling & thrashing in bed. Hands over mouth & holding throat. PO ultimately removed from oral cavity by SLP. Unable to successfully evaluate Pt's swallow profile due to limited intake without oral manipulation or swallow trigger due to pain.

## 2023-06-06 NOTE — DIETITIAN INITIAL EVALUATION ADULT - ORAL INTAKE PTA/DIET HISTORY
Pt A&Ox 0 spoke with family, pt was eating small amounts until about a week ago. Pt currently NPO, Emaciated. Family states pursuing in house hospice at this time.

## 2023-06-06 NOTE — SWALLOW BEDSIDE ASSESSMENT ADULT - COMMENTS
Pt known to this service, from recent outpatient MBSV completed on 5/22/23, rx made for easy to chew w/mildly thick liquids. Pt w/aspiration of thin liquid at that time, please see full report for details. Discussion w/family at bedside, indicating compliant w/thickening at home however Pt w/extremely poor PO intake since study. Reports of screaming/crying in pain w/attempted PO presentation, ?wounds in oral cavity.

## 2023-06-06 NOTE — DIETITIAN INITIAL EVALUATION ADULT - PERTINENT MEDS FT
MEDICATIONS  (STANDING):  Biotene Dry Mouth Oral Rinse 15 milliLiter(s) Swish and Spit every 4 hours  chlorhexidine 0.12% Liquid 15 milliLiter(s) Swish and Spit two times a day  FIRST- Mouthwash  BLM 5 milliLiter(s) Swish and Spit four times a day  heparin   Injectable 5000 Unit(s) SubCutaneous every 12 hours  piperacillin/tazobactam IVPB.. 3.375 Gram(s) IV Intermittent every 12 hours  sodium chloride 0.45% 1000 milliLiter(s) (75 mL/Hr) IV Continuous <Continuous>    MEDICATIONS  (PRN):  acetaminophen  Suppository .. 325 milliGRAM(s) Rectal every 6 hours PRN Temp greater or equal to 38C (100.4F), Mild Pain (1 - 3), Moderate Pain (4 - 6)  labetalol Injectable 5 milliGRAM(s) IV Push every 4 hours PRN sbp>170  morphine  - Injectable 0.5 milliGRAM(s) IV Push every 4 hours PRN Severe Pain (7 - 10)  ondansetron Injectable 4 milliGRAM(s) IV Push every 8 hours PRN Nausea and/or Vomiting

## 2023-06-07 LAB
ALBUMIN SERPL ELPH-MCNC: 3.2 G/DL — LOW (ref 3.3–5.2)
ALP SERPL-CCNC: 93 U/L — SIGNIFICANT CHANGE UP (ref 40–120)
ALT FLD-CCNC: 15 U/L — SIGNIFICANT CHANGE UP
ANION GAP SERPL CALC-SCNC: 19 MMOL/L — HIGH (ref 5–17)
AST SERPL-CCNC: 21 U/L — SIGNIFICANT CHANGE UP
BASOPHILS # BLD AUTO: 0.07 K/UL — SIGNIFICANT CHANGE UP (ref 0–0.2)
BASOPHILS NFR BLD AUTO: 0.3 % — SIGNIFICANT CHANGE UP (ref 0–2)
BILIRUB SERPL-MCNC: 0.4 MG/DL — SIGNIFICANT CHANGE UP (ref 0.4–2)
BUN SERPL-MCNC: 111.3 MG/DL — HIGH (ref 8–20)
CALCIUM SERPL-MCNC: 9.4 MG/DL — SIGNIFICANT CHANGE UP (ref 8.4–10.5)
CHLORIDE SERPL-SCNC: 113 MMOL/L — HIGH (ref 96–108)
CO2 SERPL-SCNC: 17 MMOL/L — LOW (ref 22–29)
CREAT SERPL-MCNC: 1.73 MG/DL — HIGH (ref 0.5–1.3)
EGFR: 27 ML/MIN/1.73M2 — LOW
EOSINOPHIL # BLD AUTO: 0.1 K/UL — SIGNIFICANT CHANGE UP (ref 0–0.5)
EOSINOPHIL NFR BLD AUTO: 0.4 % — SIGNIFICANT CHANGE UP (ref 0–6)
GLUCOSE SERPL-MCNC: 94 MG/DL — SIGNIFICANT CHANGE UP (ref 70–99)
HCT VFR BLD CALC: 34.1 % — LOW (ref 34.5–45)
HGB BLD-MCNC: 10.6 G/DL — LOW (ref 11.5–15.5)
IMM GRANULOCYTES NFR BLD AUTO: 2.1 % — HIGH (ref 0–0.9)
LYMPHOCYTES # BLD AUTO: 15.5 % — SIGNIFICANT CHANGE UP (ref 13–44)
LYMPHOCYTES # BLD AUTO: 3.84 K/UL — HIGH (ref 1–3.3)
MAGNESIUM SERPL-MCNC: 2.3 MG/DL — SIGNIFICANT CHANGE UP (ref 1.6–2.6)
MCHC RBC-ENTMCNC: 26.8 PG — LOW (ref 27–34)
MCHC RBC-ENTMCNC: 31.1 GM/DL — LOW (ref 32–36)
MCV RBC AUTO: 86.3 FL — SIGNIFICANT CHANGE UP (ref 80–100)
MONOCYTES # BLD AUTO: 2.26 K/UL — HIGH (ref 0–0.9)
MONOCYTES NFR BLD AUTO: 9.1 % — SIGNIFICANT CHANGE UP (ref 2–14)
NEUTROPHILS # BLD AUTO: 18.05 K/UL — HIGH (ref 1.8–7.4)
NEUTROPHILS NFR BLD AUTO: 72.6 % — SIGNIFICANT CHANGE UP (ref 43–77)
PHOSPHATE SERPL-MCNC: 2.6 MG/DL — SIGNIFICANT CHANGE UP (ref 2.4–4.7)
PLATELET # BLD AUTO: 476 K/UL — HIGH (ref 150–400)
POTASSIUM SERPL-MCNC: 4.6 MMOL/L — SIGNIFICANT CHANGE UP (ref 3.5–5.3)
POTASSIUM SERPL-SCNC: 4.6 MMOL/L — SIGNIFICANT CHANGE UP (ref 3.5–5.3)
PROT SERPL-MCNC: 6 G/DL — LOW (ref 6.6–8.7)
RBC # BLD: 3.95 M/UL — SIGNIFICANT CHANGE UP (ref 3.8–5.2)
RBC # FLD: 15.6 % — HIGH (ref 10.3–14.5)
SODIUM SERPL-SCNC: 149 MMOL/L — HIGH (ref 135–145)
TSH SERPL-MCNC: 2.44 UIU/ML — SIGNIFICANT CHANGE UP (ref 0.27–4.2)
WBC # BLD: 24.83 K/UL — HIGH (ref 3.8–10.5)
WBC # FLD AUTO: 24.83 K/UL — HIGH (ref 3.8–10.5)

## 2023-06-07 PROCEDURE — 99497 ADVNCD CARE PLAN 30 MIN: CPT | Mod: 25

## 2023-06-07 PROCEDURE — 99233 SBSQ HOSP IP/OBS HIGH 50: CPT

## 2023-06-07 RX ORDER — MORPHINE SULFATE 50 MG/1
5 CAPSULE, EXTENDED RELEASE ORAL EVERY 4 HOURS
Refills: 0 | Status: DISCONTINUED | OUTPATIENT
Start: 2023-06-07 | End: 2023-06-08

## 2023-06-07 RX ORDER — MORPHINE SULFATE 50 MG/1
1 CAPSULE, EXTENDED RELEASE ORAL EVERY 4 HOURS
Refills: 0 | Status: DISCONTINUED | OUTPATIENT
Start: 2023-06-07 | End: 2023-06-08

## 2023-06-07 RX ORDER — ACETAMINOPHEN 500 MG
1000 TABLET ORAL ONCE
Refills: 0 | Status: COMPLETED | OUTPATIENT
Start: 2023-06-07 | End: 2023-06-07

## 2023-06-07 RX ADMIN — DIPHENHYDRAMINE HYDROCHLORIDE AND LIDOCAINE HYDROCHLORIDE AND ALUMINUM HYDROXIDE AND MAGNESIUM HYDRO 5 MILLILITER(S): KIT at 05:30

## 2023-06-07 RX ADMIN — Medication 400 MILLIGRAM(S): at 12:40

## 2023-06-07 RX ADMIN — DIPHENHYDRAMINE HYDROCHLORIDE AND LIDOCAINE HYDROCHLORIDE AND ALUMINUM HYDROXIDE AND MAGNESIUM HYDRO 5 MILLILITER(S): KIT at 00:15

## 2023-06-07 RX ADMIN — DIPHENHYDRAMINE HYDROCHLORIDE AND LIDOCAINE HYDROCHLORIDE AND ALUMINUM HYDROXIDE AND MAGNESIUM HYDRO 5 MILLILITER(S): KIT at 20:55

## 2023-06-07 RX ADMIN — SODIUM CHLORIDE 75 MILLILITER(S): 9 INJECTION, SOLUTION INTRAVENOUS at 05:31

## 2023-06-07 RX ADMIN — Medication 1000 MILLIGRAM(S): at 13:00

## 2023-06-07 RX ADMIN — CHLORHEXIDINE GLUCONATE 15 MILLILITER(S): 213 SOLUTION TOPICAL at 05:29

## 2023-06-07 RX ADMIN — PIPERACILLIN AND TAZOBACTAM 25 GRAM(S): 4; .5 INJECTION, POWDER, LYOPHILIZED, FOR SOLUTION INTRAVENOUS at 05:30

## 2023-06-07 RX ADMIN — Medication 15 MILLILITER(S): at 20:55

## 2023-06-07 RX ADMIN — PIPERACILLIN AND TAZOBACTAM 25 GRAM(S): 4; .5 INJECTION, POWDER, LYOPHILIZED, FOR SOLUTION INTRAVENOUS at 17:12

## 2023-06-07 RX ADMIN — Medication 15 MILLILITER(S): at 09:52

## 2023-06-07 RX ADMIN — HEPARIN SODIUM 5000 UNIT(S): 5000 INJECTION INTRAVENOUS; SUBCUTANEOUS at 05:29

## 2023-06-07 RX ADMIN — Medication 15 MILLILITER(S): at 05:30

## 2023-06-07 NOTE — PROGRESS NOTE ADULT - CONVERSATION DETAILS
Extensive discussion with family (HCP daughter LINDA Dunham, son Rhett and grandson) at bedside. Family brought up prior discussion regarding comfort measures with hospitalist yesterday and after discussion collectively they would like to proceed with transitioning to comfort measures. We spoke in depth what it means to be on comfort measures in great detail. This includes but not limited to no further labs, imaging, , vitals, deescalation of IVF due to worsening edema to prevent further progression and development of respiratory distress and focus now on aggressive pain/symptom directed care in whatever time that remain. We also spoke about risks associated with pleasure feeds including but not limited to aspiration but that given goals of care, we want to allow mother to enjoy the taste of food if she desires in whatever capacity she can tolerate. Family accepts risk and wishes to proceed with allowing pleasure feeds; will place back on modified diet pureed with mildly thick with use of green swabs. We also will optimize analgesic with hopes to alleviate pain from oral cavity ulcers. Family continues to wish pursuit of inpatient hospice facility. Emotional support provided and all questions answered.     Updated MOLST for DNR/DNI with comfort measures with verbal consent by HCP daughter.     Updated hospitalist, RN, ALIE Laura on above conversation.

## 2023-06-07 NOTE — CHART NOTE - NSCHARTNOTEFT_GEN_A_CORE
Palliative care social work note.    SW contacted patients daughter Charla. SW left message and provided contact information to follow from palliative care team and available to family for additional support. Case discussed with DR Dougherty palliative care physician and SW aware of transition to comfort care.

## 2023-06-08 ENCOUNTER — TRANSCRIPTION ENCOUNTER (OUTPATIENT)
Age: 88
End: 2023-06-08

## 2023-06-08 VITALS
RESPIRATION RATE: 18 BRPM | TEMPERATURE: 98 F | OXYGEN SATURATION: 96 % | SYSTOLIC BLOOD PRESSURE: 131 MMHG | DIASTOLIC BLOOD PRESSURE: 71 MMHG | HEART RATE: 117 BPM

## 2023-06-08 LAB — SARS-COV-2 RNA SPEC QL NAA+PROBE: SIGNIFICANT CHANGE UP

## 2023-06-08 PROCEDURE — 82962 GLUCOSE BLOOD TEST: CPT

## 2023-06-08 PROCEDURE — 85610 PROTHROMBIN TIME: CPT

## 2023-06-08 PROCEDURE — 84300 ASSAY OF URINE SODIUM: CPT

## 2023-06-08 PROCEDURE — 96374 THER/PROPH/DIAG INJ IV PUSH: CPT

## 2023-06-08 PROCEDURE — 84443 ASSAY THYROID STIM HORMONE: CPT

## 2023-06-08 PROCEDURE — 80048 BASIC METABOLIC PNL TOTAL CA: CPT

## 2023-06-08 PROCEDURE — 81001 URINALYSIS AUTO W/SCOPE: CPT

## 2023-06-08 PROCEDURE — 80053 COMPREHEN METABOLIC PANEL: CPT

## 2023-06-08 PROCEDURE — 84540 ASSAY OF URINE/UREA-N: CPT

## 2023-06-08 PROCEDURE — 85027 COMPLETE CBC AUTOMATED: CPT

## 2023-06-08 PROCEDURE — 85025 COMPLETE CBC W/AUTO DIFF WBC: CPT

## 2023-06-08 PROCEDURE — 74176 CT ABD & PELVIS W/O CONTRAST: CPT | Mod: MA

## 2023-06-08 PROCEDURE — 85730 THROMBOPLASTIN TIME PARTIAL: CPT

## 2023-06-08 PROCEDURE — 83735 ASSAY OF MAGNESIUM: CPT

## 2023-06-08 PROCEDURE — 87635 SARS-COV-2 COVID-19 AMP PRB: CPT

## 2023-06-08 PROCEDURE — 99233 SBSQ HOSP IP/OBS HIGH 50: CPT

## 2023-06-08 PROCEDURE — 87040 BLOOD CULTURE FOR BACTERIA: CPT

## 2023-06-08 PROCEDURE — 93005 ELECTROCARDIOGRAM TRACING: CPT

## 2023-06-08 PROCEDURE — 82570 ASSAY OF URINE CREATININE: CPT

## 2023-06-08 PROCEDURE — 99239 HOSP IP/OBS DSCHRG MGMT >30: CPT

## 2023-06-08 PROCEDURE — 84133 ASSAY OF URINE POTASSIUM: CPT

## 2023-06-08 PROCEDURE — 83935 ASSAY OF URINE OSMOLALITY: CPT

## 2023-06-08 PROCEDURE — 99285 EMERGENCY DEPT VISIT HI MDM: CPT

## 2023-06-08 PROCEDURE — 71045 X-RAY EXAM CHEST 1 VIEW: CPT

## 2023-06-08 PROCEDURE — 84100 ASSAY OF PHOSPHORUS: CPT

## 2023-06-08 PROCEDURE — 87150 DNA/RNA AMPLIFIED PROBE: CPT

## 2023-06-08 PROCEDURE — 36415 COLL VENOUS BLD VENIPUNCTURE: CPT

## 2023-06-08 PROCEDURE — 87086 URINE CULTURE/COLONY COUNT: CPT

## 2023-06-08 PROCEDURE — 84156 ASSAY OF PROTEIN URINE: CPT

## 2023-06-08 PROCEDURE — 70450 CT HEAD/BRAIN W/O DYE: CPT | Mod: MA

## 2023-06-08 PROCEDURE — 87077 CULTURE AEROBIC IDENTIFY: CPT

## 2023-06-08 RX ORDER — MORPHINE SULFATE 50 MG/1
1 CAPSULE, EXTENDED RELEASE ORAL
Qty: 0 | Refills: 0 | DISCHARGE

## 2023-06-08 RX ORDER — ACETAMINOPHEN 500 MG
1 TABLET ORAL
Qty: 0 | Refills: 0 | DISCHARGE
Start: 2023-06-08

## 2023-06-08 RX ORDER — TRAMADOL HYDROCHLORIDE 50 MG/1
1 TABLET ORAL
Refills: 0 | DISCHARGE

## 2023-06-08 RX ORDER — CHOLECALCIFEROL (VITAMIN D3) 125 MCG
1 CAPSULE ORAL
Qty: 0 | Refills: 0 | DISCHARGE

## 2023-06-08 RX ORDER — SALIVA SUBSTITUTE COMB NO.11 351 MG
15 POWDER IN PACKET (EA) MUCOUS MEMBRANE
Qty: 0 | Refills: 0 | DISCHARGE
Start: 2023-06-08

## 2023-06-08 RX ORDER — LABETALOL HCL 100 MG
1 TABLET ORAL
Qty: 0 | Refills: 0 | DISCHARGE

## 2023-06-08 RX ORDER — ACETAMINOPHEN 500 MG
1000 TABLET ORAL ONCE
Refills: 0 | Status: DISCONTINUED | OUTPATIENT
Start: 2023-06-08 | End: 2023-06-08

## 2023-06-08 RX ORDER — DIPHENHYDRAMINE HYDROCHLORIDE AND LIDOCAINE HYDROCHLORIDE AND ALUMINUM HYDROXIDE AND MAGNESIUM HYDRO
5 KIT
Qty: 0 | Refills: 0 | DISCHARGE
Start: 2023-06-08

## 2023-06-08 RX ORDER — ASPIRIN/CALCIUM CARB/MAGNESIUM 324 MG
0 TABLET ORAL
Qty: 0 | Refills: 0 | DISCHARGE

## 2023-06-08 RX ORDER — MORPHINE SULFATE 50 MG/1
0.25 CAPSULE, EXTENDED RELEASE ORAL
Qty: 0 | Refills: 0 | DISCHARGE
Start: 2023-06-08

## 2023-06-08 RX ORDER — AMLODIPINE BESYLATE 2.5 MG/1
1 TABLET ORAL
Qty: 0 | Refills: 0 | DISCHARGE

## 2023-06-08 RX ORDER — ASCORBIC ACID 60 MG
1 TABLET,CHEWABLE ORAL
Qty: 0 | Refills: 0 | DISCHARGE

## 2023-06-08 RX ADMIN — PIPERACILLIN AND TAZOBACTAM 25 GRAM(S): 4; .5 INJECTION, POWDER, LYOPHILIZED, FOR SOLUTION INTRAVENOUS at 05:10

## 2023-06-08 NOTE — PROGRESS NOTE ADULT - REASON FOR ADMISSION
AMS, UTI, Hyperkalemia, ARF, AGMA, FTT

## 2023-06-08 NOTE — PROGRESS NOTE ADULT - PROVIDER SPECIALTY LIST ADULT
Hospitalist
Internal Medicine
Palliative Care
Palliative Care
Hospitalist
Hospitalist
Palliative Care

## 2023-06-08 NOTE — DISCHARGE NOTE PROVIDER - NSDCMRMEDTOKEN_GEN_ALL_CORE_FT
amLODIPine 5 mg oral tablet: 1 tab(s) orally once a day  aspirin 81 mg oral tablet: orally once a day  Florastor 250 mg oral capsule: 1 cap(s) orally 2 times a day - for anxiety  isosorbide mononitrate 120 mg oral tablet, extended release: 0.5 tab(s) orally once a day (in the morning)  labetalol 100 mg oral tablet: 1.5 tab(s) orally 3 times a day  multivitamin:   traMADol 50 mg oral tablet, disintegratin orally once a day as needed for pain  Vitamin C 500 mg oral capsule: 1 cap(s) orally 2 times a day  Vitamin D3 50 mcg (2000 intl units) oral tablet: 1 tab(s) orally 2 times a day

## 2023-06-08 NOTE — DISCHARGE NOTE PROVIDER - DISCHARGE DATE
*Physical Exam





- Vital Signs


 Last Vital Signs











Temp Pulse Resp BP Pulse Ox


 


 98.6 F   109 H  20   149/91   99 


 


 02/01/18 00:28  02/01/18 00:28  02/01/18 00:28  02/01/18 00:28  02/01/18 00:28 08-Jun-2023

## 2023-06-08 NOTE — PROGRESS NOTE ADULT - SUBJECTIVE AND OBJECTIVE BOX
seen for UTI, dysphagia    ros unable to obtain  spoke to family at bedside     MEDICATIONS  (STANDING):  chlorhexidine 0.12% Liquid 15 milliLiter(s) Swish and Spit two times a day  FIRST- Mouthwash  BLM 5 milliLiter(s) Swish and Spit four times a day  heparin   Injectable 5000 Unit(s) SubCutaneous every 12 hours  piperacillin/tazobactam IVPB.. 3.375 Gram(s) IV Intermittent every 12 hours  sodium chloride 0.9% 1000 milliLiter(s) (75 mL/Hr) IV Continuous <Continuous>    MEDICATIONS  (PRN):  acetaminophen  Suppository .. 325 milliGRAM(s) Rectal every 6 hours PRN Temp greater or equal to 38C (100.4F), Mild Pain (1 - 3), Moderate Pain (4 - 6)  labetalol Injectable 5 milliGRAM(s) IV Push every 4 hours PRN sbp>170  morphine  - Injectable 0.5 milliGRAM(s) IV Push every 4 hours PRN Severe Pain (7 - 10)  ondansetron Injectable 4 milliGRAM(s) IV Push every 8 hours PRN Nausea and/or Vomiting      Allergies    No Known Allergies        Vital Signs Last 24 Hrs  T(C): 36.3 (2023 10:48), Max: 36.4 (2023 21:01)  T(F): 97.4 (2023 10:48), Max: 97.5 (2023 21:01)  HR: 97 (2023 10:48) (95 - 97)  BP: 152/69 (2023 10:48) (120/61 - 156/78)  BP(mean): --  RR: 18 (2023 10:48) (18 - 18)  SpO2: 97% (2023 10:48) (95% - 99%)    Parameters below as of 2023 10:48  Patient On (Oxygen Delivery Method): room air        PHYSICAL EXAM:    GENERAL: frail, cachectic   HEENT: poor mouth hygiene  CHEST/LUNG: Clear to ausculation bilaterally;  HEART: Regular rate and rhythm; S1 S2;  ABDOMEN: Soft, Nontender, Nondistended; Bowel sounds present  EXTREMITIES:  no edema   NERVOUS SYSTEM:  groaning moaning does not follow commands     LABS:                        11.2   31.41 )-----------( 426      ( 2023 02:50 )             34.2     06-05    135  |  104  |  157.1<H>  ----------------------------<  126<H>  5.3   |  11.0<L>  |  3.16<H>    Ca    9.6      2023 02:50  Phos  3.6     06-05  Mg     2.4     06-05    TPro  6.1<L>  /  Alb  3.0<L>  /  TBili  0.4  /  DBili  x   /  AST  28  /  ALT  18  /  AlkPhos  102  06-05    PT/INR - ( 2023 18:05 )   PT: 11.9 sec;   INR: 1.03 ratio         PTT - ( 2023 18:05 )  PTT:27.4 sec  Urinalysis Basic - ( 2023 14:41 )    Color: Yellow / Appearance: Slightly Turbid / S.015 / pH: x  Gluc: x / Ketone: Negative  / Bili: Small / Urobili: Negative mg/dL   Blood: x / Protein: 30 mg/dL / Nitrite: Negative   Leuk Esterase: Moderate / RBC: >50 /HPF / WBC >50 /HPF   Sq Epi: x / Non Sq Epi: x / Bacteria: Few        CAPILLARY BLOOD GLUCOSE            RADIOLOGY & ADDITIONAL TESTS:  
INTERVAL HPI/OVERNIGHT EVENTS:    CC: toxic encephalopathy sec UTI, failure to thrive, dysphagia, CAD    No overnight events  appears comfortable.    Vital Signs Last 24 Hrs  T(C): 36.4 (08 Jun 2023 10:28), Max: 36.4 (07 Jun 2023 15:57)  T(F): 97.6 (08 Jun 2023 10:28), Max: 97.6 (07 Jun 2023 15:57)  HR: 117 (08 Jun 2023 10:28) (98 - 117)  BP: 142/76 (08 Jun 2023 10:28) (142/76 - 147/63)  BP(mean): --  RR: 18 (08 Jun 2023 10:28) (18 - 18)  SpO2: 97% (08 Jun 2023 10:28) (96% - 97%)    Parameters below as of 07 Jun 2023 15:57  Patient On (Oxygen Delivery Method): room air        PHYSICAL EXAM:    GENERAL: alert, not in distress, raw areas on buccal mucosa  CHEST/LUNG: b/l air entry  HEART: reg  ABDOMEN: soft, bs+  EXTREMITIES: no edema, tenderness     MEDICATIONS  (STANDING):  Biotene Dry Mouth Oral Rinse 15 milliLiter(s) Swish and Spit every 4 hours  FIRST- Mouthwash  BLM 5 milliLiter(s) Swish and Spit four times a day  piperacillin/tazobactam IVPB.. 3.375 Gram(s) IV Intermittent every 12 hours    MEDICATIONS  (PRN):  acetaminophen   IVPB .. 1000 milliGRAM(s) IV Intermittent once PRN Mild Pain (1 - 3), Moderate Pain (4 - 6)  acetaminophen  Suppository .. 325 milliGRAM(s) Rectal every 6 hours PRN Temp greater or equal to 38C (100.4F), Mild Pain (1 - 3), Moderate Pain (4 - 6)  LORazepam    Concentrate 0.5 milliGRAM(s) Oral every 4 hours PRN agitation or anxiety  morphine  - Injectable 1 milliGRAM(s) IV Push every 4 hours PRN breakthrough pain or dyspnea  morphine Concentrate 5 milliGRAM(s) Oral every 4 hours PRN pain, dyspnea or tachypnea RR>22  ondansetron Injectable 4 milliGRAM(s) IV Push every 8 hours PRN Nausea and/or Vomiting      Allergies    No Known Allergies    Intolerances          LABS:                          10.6   24.83 )-----------( 476      ( 07 Jun 2023 05:38 )             34.1     06-07    149<H>  |  113<H>  |  111.3<H>  ----------------------------<  94  4.6   |  17.0<L>  |  1.73<H>    Ca    9.4      07 Jun 2023 05:38  Phos  2.6     06-07  Mg     2.3     06-07    TPro  6.0<L>  /  Alb  3.2<L>  /  TBili  0.4  /  DBili  x   /  AST  21  /  ALT  15  /  AlkPhos  93  06-07          RADIOLOGY & ADDITIONAL TESTS:  
[facility] Division of Hospital Medicine    Chief Complaint:  FTT    SUBJECTIVE / OVERNIGHT EVENTS:  CT scan came back with possible obstruction    Patient denies chest pain, SOB, abd pain, N/V, fever, chills, dysuria or any other complaints. All remainder ROS negative.     MEDICATIONS  (STANDING):  amLODIPine   Tablet 5 milliGRAM(s) Oral daily  aspirin  chewable 81 milliGRAM(s) Oral daily  heparin   Injectable 5000 Unit(s) SubCutaneous every 12 hours  isosorbide   mononitrate ER Tablet (IMDUR) 60 milliGRAM(s) Oral daily  labetalol 150 milliGRAM(s) Oral every 8 hours  lactated ringers. 1000 milliLiter(s) (80 mL/Hr) IV Continuous <Continuous>  piperacillin/tazobactam IVPB.. 3.375 Gram(s) IV Intermittent every 12 hours    MEDICATIONS  (PRN):  acetaminophen     Tablet .. 650 milliGRAM(s) Oral every 6 hours PRN Temp greater or equal to 38C (100.4F), Mild Pain (1 - 3)  aluminum hydroxide/magnesium hydroxide/simethicone Suspension 30 milliLiter(s) Oral every 4 hours PRN Dyspepsia  melatonin 3 milliGRAM(s) Oral at bedtime PRN Insomnia  ondansetron Injectable 4 milliGRAM(s) IV Push every 8 hours PRN Nausea and/or Vomiting  traMADol 50 milliGRAM(s) Oral daily PRN Moderate Pain (4 - 6)        I&O's Summary      PHYSICAL EXAM:  Vital Signs Last 24 Hrs  T(C): 37.1 (2023 11:19), Max: 37.1 (2023 11:19)  T(F): 98.7 (2023 11:19), Max: 98.7 (2023 11:19)  HR: 79 (2023 11:19) (78 - 87)  BP: 153/63 (2023 11:19) (115/70 - 153/63)  BP(mean): 85 (2023 04:52) (84 - 85)  RR: 18 (2023 11:19) (12 - 19)  SpO2: 98% (2023 11:19) (93% - 99%)    Parameters below as of 2023 11:19  Patient On (Oxygen Delivery Method): room air            CONSTITUTIONAL: Elderly frail appearing/  ENMT: Dry oral mucosa, no pharyngeal injection    RESPIRATORY: Normal respiratory effort; lungs are clear   CARDIOVASCULAR: Regular rate and rhythm, normal S1 and S2, no murmur/  ABDOMEN: Nontender to palpation, normoactive bowel sounds, no rebound  MUSCLOSKELETAL:  Normal gait; no clubbing or cyanosis of digits; no joint swelling or tenderness to palpation  PSYCH: A+O X1   SKIN: No rashes; no palpable lesions  LABS:                        11.2   31.41 )-----------( 426      ( 2023 02:50 )             34.2     06-05    135  |  104  |  157.1<H>  ----------------------------<  126<H>  5.3   |  11.0<L>  |  3.16<H>    Ca    9.6      2023 02:50  Phos  3.6     06-05  Mg     2.4     06-05    TPro  6.1<L>  /  Alb  3.0<L>  /  TBili  0.4  /  DBili  x   /  AST  28  /  ALT  18  /  AlkPhos  102  06-05    PT/INR - ( 2023 18:05 )   PT: 11.9 sec;   INR: 1.03 ratio         PTT - ( 2023 18:05 )  PTT:27.4 sec      Urinalysis Basic - ( 2023 17:58 )    Color: Yellow / Appearance: very cloudy / S.015 / pH: x  Gluc: x / Ketone: Trace  / Bili: Moderate / Urobili: Negative   Blood: x / Protein: 30 mg/dL / Nitrite: Positive   Leuk Esterase: Moderate / RBC: >50 /HPF / WBC >50 /HPF   Sq Epi: x / Non Sq Epi: x / Bacteria: Moderate        CAPILLARY BLOOD GLUCOSE      POCT Blood Glucose.: 117 mg/dL (2023 17:17)        RADIOLOGY & ADDITIONAL TESTS:  Results Reviewed:   Imaging Personally Reviewed:  Electrocardiogram Personally Reviewed:                                          
[facility] PALLIATIVE MEDICINE     CC: FOLLOW UP VISIT + GOC    INTERVAL HPI/OVERNIGHT EVENTS:  Source if other than patient:  []Family   [x]Team     Failed SLP eval yesterday.   Pt pointing to her mouth. Family at bedside indicates she has been having oral pain despite mouth care/wash ordered, which has caused burning like sensation when applied.     PRESENT SYMPTOMS:     Dyspnea: no  Nausea/Vomiting: No  Anxiety:   intermittent restlessness due to oral pain/sore  Depression:  No  Fatigue:  No  Loss of appetite: NPO  Constipation:  No    Pain: yes in the mouth, appears to have a black scab/lesion on tongue            Character-            Duration-            Effect-            Factors-            Frequency-            Location-            Severity-    Pain AD Score:  [email or web address] (press ctrl + left click to view)    Review of Systems: Unable to obtain due to poor mentation/encephalopathy     MEDICATIONS  (STANDING):  acetaminophen   IVPB .. 1000 milliGRAM(s) IV Intermittent once  Biotene Dry Mouth Oral Rinse 15 milliLiter(s) Swish and Spit every 4 hours  FIRST- Mouthwash  BLM 5 milliLiter(s) Swish and Spit four times a day  piperacillin/tazobactam IVPB.. 3.375 Gram(s) IV Intermittent every 12 hours    MEDICATIONS  (PRN):  acetaminophen  Suppository .. 325 milliGRAM(s) Rectal every 6 hours PRN Temp greater or equal to 38C (100.4F), Mild Pain (1 - 3), Moderate Pain (4 - 6)  LORazepam    Concentrate 0.5 milliGRAM(s) Oral every 4 hours PRN agitation or anxiety  morphine  - Injectable 1 milliGRAM(s) IV Push every 4 hours PRN breakthrough pain or dyspnea  morphine Concentrate 5 milliGRAM(s) Oral every 4 hours PRN pain, dyspnea or tachypnea RR>22  ondansetron Injectable 4 milliGRAM(s) IV Push every 8 hours PRN Nausea and/or Vomiting    PHYSICAL EXAM:  Vital Signs Last 24 Hrs  T(C): 36.4 (07 Jun 2023 10:25), Max: 36.4 (07 Jun 2023 04:05)  T(F): 97.6 (07 Jun 2023 10:25), Max: 97.6 (07 Jun 2023 10:25)  HR: 106 (07 Jun 2023 10:25) (75 - 107)  BP: 156/70 (07 Jun 2023 10:25) (147/75 - 160/70)  BP(mean): --  RR: 18 (07 Jun 2023 10:25) (18 - 18)  SpO2: 99% (07 Jun 2023 10:25) (91% - 100%)    Parameters below as of 07 Jun 2023 10:25  Patient On (Oxygen Delivery Method): room air    Karnofsky:  20%    GEN: frail. cachetic. resting comfortably and no acute distress    HEENT: mucous membrane dry with ulcers noted and black scab in back of tongue    Lungs: comfortable, nonlabored     CV: +s1/s2 rrr    GI: +BS, abdomen soft, nontender     MSK: no cyanosis or edema +bedbound    NEURO: nonfocal. awake but confused, minimally conversant    Skin: warm and dry.      LABS:                          10.6   24.83 )-----------( 476      ( 07 Jun 2023 05:38 )             34.1     06-07    149<H>  |  113<H>  |  111.3<H>  ----------------------------<  94  4.6   |  17.0<L>  |  1.73<H>    Ca    9.4      07 Jun 2023 05:38  Phos  2.6     06-07  Mg     2.3     06-07    TPro  6.0<L>  /  Alb  3.2<L>  /  TBili  0.4  /  DBili  x   /  AST  21  /  ALT  15  /  AlkPhos  93  06-07      RADIOLOGY & ADDITIONAL STUDIES: Reviewed     ADVANCE DIRECTIVES/TREATMENT PREFERENCES: DNR/DNI no peg, comfort measures, updated MOLST 6/7/23    NEUROLOGICAL MEDICATIONS/OPIOIDS/BENZODIAZEPINE IN PAST 24 HOURS:        
[facility] PALLIATIVE MEDICINE     CC: FOLLOW UP VISIT + GOC    INTERVAL HPI/OVERNIGHT EVENTS:  Source if other than patient:  []Family   [x]Team     GOC yesterday and family elected for transition to comfort measures with pleasure feed and finish antibiotic course (last dose this AM)  No acute events overnight.     PRESENT SYMPTOMS:     Dyspnea: no  Nausea/Vomiting: No  Anxiety:   No  Depression:  Unable to obtain  Fatigue:  Yes  Loss of appetite: Yes  Constipation:  None documented    Pain: yes intermittent, pt points to her mouth, likely due to oral ulcers. per family, moderate improvement with IV tylenol yesterday            Character-            Duration-            Effect-            Factors-            Frequency-            Location-            Severity-    Pain AD Score:  [email or web address] (press ctrl + left click to view)    Review of Systems: Unable to obtain due to poor mentation/encephalopathy        PHYSICAL EXAM:  Vital Signs Last 24 Hrs  T(C): 36.4 (07 Jun 2023 10:25), Max: 36.4 (07 Jun 2023 04:05)  T(F): 97.6 (07 Jun 2023 10:25), Max: 97.6 (07 Jun 2023 10:25)  HR: 106 (07 Jun 2023 10:25) (75 - 107)  BP: 156/70 (07 Jun 2023 10:25) (147/75 - 160/70)  BP(mean): --  RR: 18 (07 Jun 2023 10:25) (18 - 18)  SpO2: 99% (07 Jun 2023 10:25) (91% - 100%)    Parameters below as of 07 Jun 2023 10:25  Patient On (Oxygen Delivery Method): room air    Karnofsky:  20%    GEN: frail. cachetic. resting comfortably and no acute distress    HEENT: mucous membrane dry +oral sores/ulcers    Lungs: comfortable, nonlabored     CV: +s1/s2 rrr    GI: +BS, abdomen soft, NDNT    MSK: no cyanosis or edema +bedbound    NEURO: nonfocal. awake but confused. minimally verbal.     Skin: warm and dry.      LABS: reviewed     RADIOLOGY & ADDITIONAL STUDIES: Reviewed     ADVANCE DIRECTIVES/TREATMENT PREFERENCES: DNR/DNI no peg, comfort measures, updated MOLST 6/7/23    NEUROLOGICAL MEDICATIONS/OPIOIDS/BENZODIAZEPINE IN PAST 24 HOURS:    acetaminophen   IVPB ..   400 mL/Hr IV Intermittent (06-07-23 @ 12:40)    
INTERVAL HPI/OVERNIGHT EVENTS:    CC: toxic encephalopathy sec UTI, failure to thrive, dysphagia, CAD      Chart and course reviewed.  family at bedside  diet advanced today after being transitioned to comfort.  family concerned about black lesion on back of her tongue, which they are unsure how long its been for    Vital Signs Last 24 Hrs  T(C): 36.4 (07 Jun 2023 15:57), Max: 36.4 (07 Jun 2023 04:05)  T(F): 97.6 (07 Jun 2023 15:57), Max: 97.6 (07 Jun 2023 10:25)  HR: 98 (07 Jun 2023 15:57) (75 - 107)  BP: 147/63 (07 Jun 2023 15:57) (147/63 - 160/70)  BP(mean): --  RR: 18 (07 Jun 2023 15:57) (18 - 18)  SpO2: 96% (07 Jun 2023 15:57) (91% - 100%)    Parameters below as of 07 Jun 2023 15:57  Patient On (Oxygen Delivery Method): room air        PHYSICAL EXAM:    GENERAL: alert, comfortable, raw areas on buccal mucosa, black area on tongue, seems adhered to tongue  CHEST/LUNG: b/l air entry  HEART: reg  ABDOMEN: soft, bs+  EXTREMITIES:  no edema, tenderness    MEDICATIONS  (STANDING):  Biotene Dry Mouth Oral Rinse 15 milliLiter(s) Swish and Spit every 4 hours  FIRST- Mouthwash  BLM 5 milliLiter(s) Swish and Spit four times a day  piperacillin/tazobactam IVPB.. 3.375 Gram(s) IV Intermittent every 12 hours    MEDICATIONS  (PRN):  acetaminophen  Suppository .. 325 milliGRAM(s) Rectal every 6 hours PRN Temp greater or equal to 38C (100.4F), Mild Pain (1 - 3), Moderate Pain (4 - 6)  LORazepam    Concentrate 0.5 milliGRAM(s) Oral every 4 hours PRN agitation or anxiety  morphine  - Injectable 1 milliGRAM(s) IV Push every 4 hours PRN breakthrough pain or dyspnea  morphine Concentrate 5 milliGRAM(s) Oral every 4 hours PRN pain, dyspnea or tachypnea RR>22  ondansetron Injectable 4 milliGRAM(s) IV Push every 8 hours PRN Nausea and/or Vomiting      Allergies    No Known Allergies    Intolerances          LABS:                          10.6   24.83 )-----------( 476      ( 07 Jun 2023 05:38 )             34.1     06-07    149<H>  |  113<H>  |  111.3<H>  ----------------------------<  94  4.6   |  17.0<L>  |  1.73<H>    Ca    9.4      07 Jun 2023 05:38  Phos  2.6     06-07  Mg     2.3     06-07    TPro  6.0<L>  /  Alb  3.2<L>  /  TBili  0.4  /  DBili  x   /  AST  21  /  ALT  15  /  AlkPhos  93  06-07          RADIOLOGY & ADDITIONAL TESTS:  
[facility] PALLIATIVE MEDICINE     CC: FOLLOW UP VISIT + GOC    INTERVAL HPI/OVERNIGHT EVENTS:  Source if other than patient:  []Family   [x]Team     No acute events overnight.   Pt more awake today.   Daughter, son and grandson at bedside.   Seen at bedside along with hospitalist.     PRESENT SYMPTOMS:     Dyspnea: no  Nausea/Vomiting: No  Anxiety:   No  Depression:  No  Fatigue:  No  Loss of appetite: Yes    Constipation:  No    Pain: No            Character-            Duration-            Effect-            Factors-            Frequency-            Location-            Severity-    Pain AD Score:  [email or web address] (press ctrl + left click to view)    Review of Systems: Unable to obtain due to poor mentation/encephalopathy     MEDICATIONS  (STANDING):  amLODIPine   Tablet 5 milliGRAM(s) Oral daily  aspirin  chewable 81 milliGRAM(s) Oral daily  heparin   Injectable 5000 Unit(s) SubCutaneous every 12 hours  isosorbide   mononitrate ER Tablet (IMDUR) 60 milliGRAM(s) Oral daily  labetalol 150 milliGRAM(s) Oral every 8 hours  lactated ringers. 1000 milliLiter(s) (80 mL/Hr) IV Continuous <Continuous>  piperacillin/tazobactam IVPB.. 3.375 Gram(s) IV Intermittent every 12 hours    MEDICATIONS  (PRN):  acetaminophen     Tablet .. 650 milliGRAM(s) Oral every 6 hours PRN Temp greater or equal to 38C (100.4F), Mild Pain (1 - 3)  aluminum hydroxide/magnesium hydroxide/simethicone Suspension 30 milliLiter(s) Oral every 4 hours PRN Dyspepsia  melatonin 3 milliGRAM(s) Oral at bedtime PRN Insomnia  ondansetron Injectable 4 milliGRAM(s) IV Push every 8 hours PRN Nausea and/or Vomiting  traMADol 50 milliGRAM(s) Oral daily PRN Moderate Pain (4 - 6)      PHYSICAL EXAM:    Vital Signs Last 24 Hrs  T(C): 36.4 (2023 05:23), Max: 37.1 (2023 11:19)  T(F): 97.5 (2023 05:23), Max: 98.7 (2023 11:19)  HR: 96 (2023 05:23) (79 - 96)  BP: 156/78 (2023 05:23) (120/61 - 156/78)  BP(mean): --  RR: 18 (2023 05:23) (18 - 18)  SpO2: 97% (2023 05:23) (95% - 99%)    Parameters below as of 2023 21:01  Patient On (Oxygen Delivery Method): room air    Karnofsky:  %    GEN: frail. cachetic. resting comfortably and no acute distress    HEENT: mucous membrane moist      Lungs: comfortable, nonlabored     CV: +s1/s2 regular rate and rhythm     GI: +BS, abdomen soft, nontender     MSK: no cyanosis or edema +bedbound    NEURO: nonfocal. awake but confused    Skin: warm and dry.      LABS:                          11.2   31.41 )-----------( 426      ( 2023 02:50 )             34.2     06-05    135  |  104  |  157.1<H>  ----------------------------<  126<H>  5.3   |  11.0<L>  |  3.16<H>    Ca    9.6      2023 02:50  Phos  3.6     06-05  Mg     2.4     06-05    TPro  6.1<L>  /  Alb  3.0<L>  /  TBili  0.4  /  DBili  x   /  AST  28  /  ALT  18  /  AlkPhos  102  06-05    PT/INR - ( 2023 18:05 )   PT: 11.9 sec;   INR: 1.03 ratio         PTT - ( 2023 18:05 )  PTT:27.4 sec  Urinalysis Basic - ( 2023 14:41 )    Color: Yellow / Appearance: Slightly Turbid / S.015 / pH: x  Gluc: x / Ketone: Negative  / Bili: Small / Urobili: Negative mg/dL   Blood: x / Protein: 30 mg/dL / Nitrite: Negative   Leuk Esterase: Moderate / RBC: >50 /HPF / WBC >50 /HPF   Sq Epi: x / Non Sq Epi: x / Bacteria: Few    RADIOLOGY & ADDITIONAL STUDIES: Reviewed     ADVANCE DIRECTIVES/TREATMENT PREFERENCES:  DNR YES NO  Completed on:                     MOLST  YES NO   Completed on:  Living Will  YES NO   Completed on:    NEUROLOGICAL MEDICATIONS/OPIOIDS/BENZODIAZEPINE IN PAST 24 HOURS

## 2023-06-08 NOTE — PROGRESS NOTE ADULT - NUTRITIONAL ASSESSMENT
This patient has been assessed with a concern for Malnutrition and has been determined to have a diagnosis/diagnoses of Severe protein-calorie malnutrition and Underweight (BMI < 19).    This patient is being managed with:   Diet Pureed-  Mildly Thick Liquids (MILDTHICKLIQS)  Liquid via Teaspoon Only  Entered: Jun 7 2023 11:07AM  
This patient has been assessed with a concern for Malnutrition and has been determined to have a diagnosis/diagnoses of Severe protein-calorie malnutrition and Underweight (BMI < 19).    This patient is being managed with:   Diet Pureed-  Mildly Thick Liquids (MILDTHICKLIQS)  Liquid via Teaspoon Only  Entered: Jun 7 2023 11:07AM

## 2023-06-08 NOTE — PROGRESS NOTE ADULT - ASSESSMENT
ASSESSMENT:  89F with PMHX CVA, CAD, HTN, Chronic Pain, Colon CA presents to [facility] ER with FTT for past 2 weeks admitted for AMS 2/2 Acute Complicated UTI, ARF, Hyperkalemia, AGMA, and FTT.    Toxic Metabolic Encephalopathy  -Likely due to underlying uremia from NENA and metabolic disturbances  -Monitor closely     Acute Colonic obstruction   Suspect distal   due to either adhesion or anastomotic   stricture in the pelvis. Ileus less likely.  -Colorectal recs appreciated, no surgical intervention being offered  -DNR/DNI   -NPO    Hyperkalemia 2/2 Acute kidney injury   -Hyperkalemia now resolved   -Appears pre-renal very dry oral mucosa and low UOP    -IVF LR @ 80cc/hr   -Renal Dose Meds  -Avoid Nephrotoxins  -Check Urine Studies  -Check Bladder Scan x1      Acute Complicated UTI  -UA Positive. UCX Pending  -BCX x2 Pending  -Zosyn 3.375g q12 renal dosing    Dysphagia, FTT  -NPO except meds  -Bedside Dysphagia screen  -Nutrition Consult    CVA, CAD, HTN  -NPO blood pressure acceptable   -Amlodipine 5mg q24  -Labetalol 150mg TID   -ASA 81mg q24 (no longer on Aggrenox)  -Imdur 60mg q24    Chronic Pain  -Morphine 1 mg IV PRN     Goals of Care  -Discussed GOC with Family at bedside son and daughter  -Daughter is HCP Charla Stanley   -Son is primary caregiver at home  -RIO discussed and re-done patient DNR/DNI but okay for IV fluids, labs and ABX only   -Family does not desire heroic measures   -Palliative Care Consulted
89F with PMHX CVA, CAD, HTN, Chronic Pain, Colon CA presents to [facility] ER with FTT for past 2 weeks admitted for AMS 2/2 Acute Complicated UTI, ARF, Hyperkalemia, AGMA, and FTT.    Toxic Metabolic Encephalopathy  -Likely due to underlying uremia from NENA and metabolic disturbances  -Monitor closely     Acute Colonic obstruction   Suspect distal, due to either adhesion or anastomotic stricture in the pelvis. Ileus less likely.  -Colorectal recs appreciated, no surgical intervention being offered  -DNR/DNI   -NPO, speech eval    Hyperkalemia 2/2 Acute kidney injury   -Hyperkalemia now resolved   change fluid to bicarb  -Avoid Nephrotoxins  -Check Urine Studies  bladder scans      Acute Complicated UTI  -UA Positive. UCX Pending  -BCX x2.  coag neg staph x1 likely contaminant    repeat blood culture in am   -Zosyn 3.375g q12 renal dosing    Dysphagia, FTT  -NPO except meds  speech eval  mouth care      CVA, CAD, HTN  hold -Amlodipine 5mg q24 -Labetalol 150mg TID -ASA 81mg q24 (no longer on Aggrenox)-Imdur 60mg q24  prn labetalol    Chronic Pain  -Morphine 0.5 mg IV PRN     spoke to family at bedside.  understand patient is hospice appropriate. cannot manage at home and adamant about no NH with hospice.  currently not hospice inn candidate. monitor in next 2-3 days to evaluate progress or decline and hospice inn eligibility   -Daughter is HCP Charla Moralesthomas   -Son is primary caregiver at home  -RIO discussed and re-done patient DNR/DNI but okay for IV fluids, labs and ABX only   -Family does not desire heroic measures   -Palliative Care following 
89F with hx of prior CVAs/TIAs, CAD, HTN, remote colon cancer s/p partial colectomy, chronic dysphagia, dementia presents from home with failure to thrive and AMS, found with leukocytosis, NENA with uremia, UTI and presumed LBO on CT. GOC initiated by primary team, family elected for DNR/DNI but continue with conservative medical mgnt and interest in hospice eval. Palliative was consulted for support and ongoing goc/hospice.     Acute Encephalopathy  Advancing Dementia, Vascular in Nature  - CTH with chronic microvascular ischemic changes  - etiology multifactorial: infection, uremia, electrolyte abnormalities, FTT, suspect underlying dementia  - family has reported progressive memory deficits, debility and more recently mostly assist with ADLs, poor intake/dysphagia and mostly sedentary for past 1 month   - advance stage and hospice appropriate, FAST 7C  - supportive care, reorientation, sleep hygiene     Leukocytosis  Presumed UTI  - now on comfort measures and family wishes to complete antibiotic course (last dose today)    Acute Pain  - likely secondary to oral sores, some relief with IV tylenol yesterday  - reordered IV Acetominophen 1GM PRN    - c/w PO morphine concentrate 5mg q4H PRN  - c/w IV morphine 1mg q4H PRN for breakthrough pain/dyspnea  - c/w biotene, mouthwash, daily oral care    Acute/Chronic Dysphagia  Anorexia/Cachexia Syndrome  - failed SLP eval 6/6  - on comfort measures and family accepts risk as discussed in detail and wish to proceed with pleasure feeds as mother can tolerate  - minimal to no intake per family    Agitation  - c/w oral concentrate ativan 0.5mg PRN    Palliative Care Encounter  - HCP is corky Dunham  - DNR/DNI MOLST in place   - GOC: on comfort measures and pending hospice evaluation with preference for inpatient by family  - [facility] assisting with disposition planning  - Palliative care will continue to support and assist with pain/symptom mgnt  - D/W family at bedside, emotional support provided and all questions answered    
89F with PMHX CVA, CAD, HTN, Chronic Pain, Colon CA presents to [facility] ER with FTT for past 2 weeks admitted for AMS 2/2 Acute Complicated UTI, ARF, Hyperkalemia, AGMA, and FTT.    Toxic Metabolic Encephalopathy  -Likely due to underlying uremia from NENA and metabolic disturbances  improving    Acute Colonic obstruction   Suspect distal, due to either adhesion or anastomotic stricture in the pelvis. Ileus less likely.  -Colorectal recs appreciated, no surgical intervention being offered  -DNR/DNI       Hyperkalemia 2/2 Acute kidney injury   NENA improving  Potassium improved      Acute Complicated UTI  Completes antibiotics today.    Dysphagia, FTT  diet advanced, palliative follow up noted  pleasure feeds  asp risk was explained to palliative care.       CVA, CAD, HTN  hold -Amlodipine 5mg q24 -Labetalol 150mg TID -ASA 81mg q24 (no longer on Aggrenox)-Imdur 60mg q24  prn labetalol    Chronic Pain  -Morphine 0.5 mg IV PRN     Tongue lesion:  Unclear  pain control  oral care, this was discussed with patient's family  they do not wish any intervention besides pain control and oral care.     Discussed with patient's family at bedside.  Discussed with palliative care.  They wish to pursue inpatient hospice. SW following. 
89F with PMHX CVA, CAD, HTN, Chronic Pain, Colon CA presents to [facility] ER with FTT for past 2 weeks admitted for AMS 2/2 Acute Complicated UTI, ARF, Hyperkalemia, AGMA, and FTT.    Toxic Metabolic Encephalopathy  -Likely due to underlying uremia from NENA and metabolic disturbances  improving    Acute Colonic obstruction   Suspect distal, due to either adhesion or anastomotic stricture in the pelvis. Ileus less likely.  -Colorectal recs appreciated, no surgical intervention being offered  -DNR/DNI       Hyperkalemia 2/2 Acute kidney injury   NENA improving    Acute Complicated UTI  Completed antibiotics.    Dysphagia, FTT  diet advanced, palliative follow up noted  pleasure feeds  asp risk was explained to palliative care.       CVA, CAD, HTN  hold -Amlodipine 5mg q24 -Labetalol 150mg TID -ASA 81mg q24 (no longer on Aggrenox)-Imdur 60mg q24  prn labetalol    Chronic Pain  -Morphine 0.5 mg IV PRN       Discussed with hospice team and RN.  Stable for transfer to inpatient hospice. 
89F with hx of prior CVAs/TIAs, CAD, HTN, remote colon cancer s/p partial colectomy, chronic dysphagia, dementia presents from home with failure to thrive and AMS, found with leukocytosis, NENA with uremia, UTI and presumed LBO on CT. GOC initiated by primary team, family elected for DNR/DNI but continue with conservative medical mgnt and interest in hospice eval. Palliative was consulted for support and ongoing goc/hospice.     Acute Encephalopathy  Advancing Dementia, Vascular in Nature  - CTH with chronic microvascular ischemic changes  - etiology multifactorial: infection, uremia, electrolyte abnormalities, FTT, suspect underlying dementia  - family has reported progressive memory deficits, debility and more recently mostly assist with ADLs, poor intake/dysphagia and mostly sedentaty for past 1 month   - advance stage and would be a good hospice candidate   - supportive care, reorientation, sleep hygiene     Leukocytosis  Presumed UTI  - UA grossly positive, follow up urine cx  - blood cx 6/5 with coag neg staph in aerobic bottle ?contaminant --> follow up repeat cx  - c/w antibiotics  - trend wbc and fever curve    NENA/?CKD + Uremia  - likely secondary to poor oral intake  - c/w IVF, serial renal indices and avoid nephrotoxic agents    Acute/Chronic Dysphagia  Anorexia/Cachexia Syndrome  - recent MBS 5/22 rec easy to chew with mildly thicken liquid by the 1/2 teaspoonful but notes that pt is unlikely to maintain adequate nutrition/hydration   - currently NPO, aspiration precaution and pending SLP eval    Palliative Care Encounter  - HCP is daughter Charla  - DNR/DNI MOLST in place  - Extensive GOC 6/5: wants to continue with conservative medical mgnt including IVF/antibiotics with pursuit of hospice eval  - Discussed with family today along with hospitalist that mother currently does not meet criteria for inpatient hospice facility (per writer's discussion with Hospice MD) as she is not requiring any intravenous medications for symptom control and more appropriate for hospice at home at this time. Daughter reports there is insufficient support at home and remains adamant about no NH placement. We agreed that we would continue with active medical management and monitor for any developing symptoms over the next 24-48hours to be reconsidered for inpatient facility. Palliative care will continue to support and assist with any pain/symptom control and any further goals of care need. 
89F with hx of prior CVAs/TIAs, CAD, HTN, remote colon cancer s/p partial colectomy, chronic dysphagia, dementia presents from home with failure to thrive and AMS, found with leukocytosis, NENA with uremia, UTI and presumed LBO on CT. GOC initiated by primary team, family elected for DNR/DNI but continue with conservative medical mgnt and interest in hospice eval. Palliative was consulted for support and ongoing goc/hospice.     Acute Encephalopathy  Advancing Dementia, Vascular in Nature  - CTH with chronic microvascular ischemic changes  - etiology multifactorial: infection, uremia, electrolyte abnormalities, FTT, suspect underlying dementia  - family has reported progressive memory deficits, debility and more recently mostly assist with ADLs, poor intake/dysphagia and mostly sedentaty for past 1 month   - advance stage and hospice appropriate, FAST 7C  - supportive care, reorientation, sleep hygiene     Leukocytosis  Presumed UTI  - now on comfort measures and family wishes to complete antibiotic course (to be done today)    Acute Pain  - likely secondary to oral ulcers  - added IV Acetominophen 1GM once  - added PO morphine concentrate 5mg q4H PRN  - added IV morphine 1mg q4H PRN for breakthrough pain/dyspnea  - c/w biotene, mouthwash, daily oral care    Acute/Chronic Dysphagia  Anorexia/Cachexia Syndrome  - failed SLP eval 6/6  - now on comfort measures and family accepts risk as discussed in detail and wish to proceed with pleasure feeds as mother can tolerate    Agitation  - added oral concentrate ativan 0.5mg PRN    Palliative Care Encounter  - HCP is daughter Charla  - DNR/DNI MOLST in place   - see goc above: Family elected for transition to comfort measures, finish antibiotic course for UTI and allow pleasure feeds. They continue to wish for hospice evaluation and preference for inpatient facility for ongoing end of life care  - CCM assisting with hospice referrals  - Palliative care will continue to support and assist with pain/symptom mgnt.        D/W daughter, LINDA, son, hospitalist, RN, CCM YAIR Torres Palliative team

## 2023-06-08 NOTE — DISCHARGE NOTE PROVIDER - ATTENDING DISCHARGE PHYSICAL EXAMINATION:
GENERAL: alert, comfortable, raw areas on buccal mucosa  CHEST/LUNG: b/l air entry  HEART: reg  ABDOMEN: soft, bs+  EXTREMITIES:  no edema, tenderness

## 2023-06-08 NOTE — PROGRESS NOTE ADULT - TIME BILLING
D/W RN, hospitalist Dr Reinoso, daughter/son/grandson, hospice liaison [place], ALIE Morris    Total time also includes discussion during interdisciplinary team rounds, chart review, review of medications/ labs/ imaging, examination, care coordination with other health care professionals, documentation.
D/W daughter, LINDA RN, CCM S Laura    Total time also includes discussion during interdisciplinary team rounds, chart review, review of medications/ labs/ imaging, examination, care coordination with other health care professionals, documentation.

## 2023-06-08 NOTE — DISCHARGE NOTE NURSING/CASE MANAGEMENT/SOCIAL WORK - PATIENT PORTAL LINK FT
You can access the FollowMyHealth Patient Portal offered by [facility] by registering at the following website: [email or web address]. By joining [facility]’s FollowMyHealth portal, you will also be able to view your health information using other applications (apps) compatible with our system.

## 2023-06-08 NOTE — DISCHARGE NOTE NURSING/CASE MANAGEMENT/SOCIAL WORK - NSDCPEFALRISK_GEN_ALL_CORE
For information on Fall & Injury Prevention, visit: [email or web address] OR  [email or web address] OR  https://www.cdc.gov/steadi/patient.html

## 2023-06-08 NOTE — DISCHARGE NOTE PROVIDER - NSDCCPCAREPLAN_GEN_ALL_CORE_FT
PRINCIPAL DISCHARGE DIAGNOSIS  Diagnosis: Acute UTI  Assessment and Plan of Treatment: completed antibiotics      SECONDARY DISCHARGE DIAGNOSES  Diagnosis: Failure to thrive in adult  Assessment and Plan of Treatment: comfort measures    Diagnosis: Toxic metabolic encephalopathy  Assessment and Plan of Treatment: comfort measures    Diagnosis: NENA (acute kidney injury)  Assessment and Plan of Treatment: comfort measures

## 2023-06-08 NOTE — DISCHARGE NOTE PROVIDER - HOSPITAL COURSE
89F with PMHX CVA, CAD, HTN, Chronic Pain, Colon CA presents to [facility] ER with FTT for past 2 weeks admitted for AMS 2/2 Acute Complicated UTI, ARF, Hyperkalemia, AGMA, and FTT. She was admitted for toxic metabolic encephalopathy, noted to have UTI, for which antibiotics were given. Concern for acute colonic obstruction, evaluated by colorectal, no surgical intervention. Hyperkalemia and NENA improved with IVF. Initially swallow eval failed, palliative care consulted, goals of care were discussed, made DNR/DNI by HCP. Hospice was consulted. She was given pleasure feeds. Aspiration risk discussed with family. She is stable for transfer to hospice inn.

## 2023-06-10 LAB
CULTURE RESULTS: SIGNIFICANT CHANGE UP
SPECIMEN SOURCE: SIGNIFICANT CHANGE UP

## 2023-06-12 LAB
CULTURE RESULTS: SIGNIFICANT CHANGE UP
SPECIMEN SOURCE: SIGNIFICANT CHANGE UP

## 2023-12-13 NOTE — SWALLOW BEDSIDE ASSESSMENT ADULT - MUCOSAL QUALITY
12/13/23 1320   Team Meeting   Meeting Type Daily Rounds   Team Members Present   Team Members Present Physician;Nurse;   Physician Team Member Mark   Nursing Team Member Giancarlo   Care Management Team Member Miguelito   Patient/Family Present   Patient Present No   Patient's Family Present No     Patient currently holds 201 status. Patient is medication and meal compliant. Patient has been isolative to room. Patient reports since starting Zyprexa he has noticed that volume of the AH has decreased. Patient to continue on Lexapro 20mg, Haldol 5 mg, lithium 1200 HS, and lithium 300 mg daily. Patient to also continue on Remeron 45 mg, Zyprexa 10 mg daily, and Zyprexa 20 mg HS. Patient discharge date and plan to be determined.         dried/red-crusted oral mucosa, unable to visualize true open wound/injury in oral cavity